# Patient Record
Sex: FEMALE | Race: OTHER | NOT HISPANIC OR LATINO
[De-identification: names, ages, dates, MRNs, and addresses within clinical notes are randomized per-mention and may not be internally consistent; named-entity substitution may affect disease eponyms.]

---

## 2017-04-18 PROBLEM — Z00.00 ENCOUNTER FOR PREVENTIVE HEALTH EXAMINATION: Status: ACTIVE | Noted: 2017-04-18

## 2017-08-15 ENCOUNTER — APPOINTMENT (OUTPATIENT)
Dept: ENDOCRINOLOGY | Facility: CLINIC | Age: 77
End: 2017-08-15
Payer: MEDICARE

## 2017-08-15 VITALS
DIASTOLIC BLOOD PRESSURE: 72 MMHG | WEIGHT: 249 LBS | HEART RATE: 72 BPM | BODY MASS INDEX: 48.88 KG/M2 | SYSTOLIC BLOOD PRESSURE: 141 MMHG | HEIGHT: 60 IN

## 2017-08-15 DIAGNOSIS — J44.9 CHRONIC OBSTRUCTIVE PULMONARY DISEASE, UNSPECIFIED: ICD-10-CM

## 2017-08-15 PROCEDURE — 99214 OFFICE O/P EST MOD 30 MIN: CPT

## 2017-08-15 RX ORDER — ALBUTEROL SULFATE 90 UG/1
108 (90 BASE) AEROSOL, METERED RESPIRATORY (INHALATION) EVERY 6 HOURS
Qty: 2 | Refills: 1 | Status: ACTIVE | COMMUNITY
Start: 2017-08-15 | End: 1900-01-01

## 2017-08-15 RX ORDER — PANTOPRAZOLE SODIUM 40 MG/10ML
40 INJECTION, POWDER, FOR SOLUTION INTRAVENOUS
Qty: 30 | Refills: 2 | Status: ACTIVE | COMMUNITY
Start: 2017-08-15 | End: 1900-01-01

## 2017-08-17 LAB
ALBUMIN SERPL ELPH-MCNC: 3.9 G/DL
ALP BLD-CCNC: 90 U/L
ALT SERPL-CCNC: 17 U/L
ANION GAP SERPL CALC-SCNC: 11 MMOL/L
AST SERPL-CCNC: 18 U/L
BILIRUB SERPL-MCNC: 0.4 MG/DL
BUN SERPL-MCNC: 12 MG/DL
CALCIUM SERPL-MCNC: 9.5 MG/DL
CHLORIDE SERPL-SCNC: 101 MMOL/L
CHOLEST SERPL-MCNC: 188 MG/DL
CHOLEST/HDLC SERPL: 2 RATIO
CO2 SERPL-SCNC: 33 MMOL/L
CREAT SERPL-MCNC: 0.85 MG/DL
CREAT SPEC-SCNC: 39 MG/DL
GLUCOSE SERPL-MCNC: 126 MG/DL
HBA1C MFR BLD HPLC: 8.5 %
HDLC SERPL-MCNC: 94 MG/DL
LDLC SERPL CALC-MCNC: 75 MG/DL
MICROALBUMIN 24H UR DL<=1MG/L-MCNC: 0.4 MG/DL
MICROALBUMIN/CREAT 24H UR-RTO: 10 MG/G
POTASSIUM SERPL-SCNC: 4.5 MMOL/L
PROT SERPL-MCNC: 7.3 G/DL
SODIUM SERPL-SCNC: 145 MMOL/L
T4 FREE SERPL-MCNC: 1.6 NG/DL
TRIGL SERPL-MCNC: 97 MG/DL
TSH SERPL-ACNC: 5.66 UIU/ML

## 2017-09-14 ENCOUNTER — APPOINTMENT (OUTPATIENT)
Dept: ENDOCRINOLOGY | Facility: CLINIC | Age: 77
End: 2017-09-14

## 2018-02-12 ENCOUNTER — APPOINTMENT (OUTPATIENT)
Dept: ENDOCRINOLOGY | Facility: CLINIC | Age: 78
End: 2018-02-12
Payer: MEDICARE

## 2018-02-12 VITALS
DIASTOLIC BLOOD PRESSURE: 73 MMHG | HEIGHT: 60 IN | HEART RATE: 78 BPM | SYSTOLIC BLOOD PRESSURE: 133 MMHG | WEIGHT: 245 LBS | BODY MASS INDEX: 48.1 KG/M2

## 2018-02-12 PROCEDURE — 99214 OFFICE O/P EST MOD 30 MIN: CPT | Mod: 25

## 2018-02-12 PROCEDURE — 36415 COLL VENOUS BLD VENIPUNCTURE: CPT

## 2018-02-12 RX ORDER — 70%ISOPROPYL ALCOHOL 0.7 ML/ML
70 SWAB TOPICAL
Qty: 1 | Refills: 2 | Status: ACTIVE | COMMUNITY
Start: 2018-02-12 | End: 1900-01-01

## 2018-03-02 LAB
ALBUMIN SERPL ELPH-MCNC: 3.9 G/DL
ALP BLD-CCNC: 76 U/L
ALT SERPL-CCNC: 17 U/L
ANION GAP SERPL CALC-SCNC: 14 MMOL/L
AST SERPL-CCNC: 24 U/L
BILIRUB SERPL-MCNC: 0.4 MG/DL
BUN SERPL-MCNC: 15 MG/DL
CALCIUM SERPL-MCNC: 10.1 MG/DL
CHLORIDE SERPL-SCNC: 98 MMOL/L
CHOLEST SERPL-MCNC: 193 MG/DL
CHOLEST/HDLC SERPL: 2.2 RATIO
CO2 SERPL-SCNC: 29 MMOL/L
CREAT SERPL-MCNC: 0.91 MG/DL
CREAT SPEC-SCNC: 56 MG/DL
GLUCOSE SERPL-MCNC: 121 MG/DL
HBA1C MFR BLD HPLC: 8.3 %
HDLC SERPL-MCNC: 89 MG/DL
LDLC SERPL CALC-MCNC: 89 MG/DL
MICROALBUMIN 24H UR DL<=1MG/L-MCNC: <0.3 MG/DL
MICROALBUMIN/CREAT 24H UR-RTO: NORMAL
POTASSIUM SERPL-SCNC: 4.6 MMOL/L
PROT SERPL-MCNC: 7.5 G/DL
SODIUM SERPL-SCNC: 141 MMOL/L
T4 FREE SERPL-MCNC: 1.7 NG/DL
TRIGL SERPL-MCNC: 76 MG/DL
TSH SERPL-ACNC: 3.17 UIU/ML

## 2018-04-19 ENCOUNTER — RX RENEWAL (OUTPATIENT)
Age: 78
End: 2018-04-19

## 2018-07-09 ENCOUNTER — APPOINTMENT (OUTPATIENT)
Dept: ENDOCRINOLOGY | Facility: CLINIC | Age: 78
End: 2018-07-09
Payer: MEDICARE

## 2018-07-09 VITALS
SYSTOLIC BLOOD PRESSURE: 128 MMHG | DIASTOLIC BLOOD PRESSURE: 69 MMHG | BODY MASS INDEX: 48.88 KG/M2 | WEIGHT: 249 LBS | HEART RATE: 68 BPM | HEIGHT: 60 IN

## 2018-07-09 PROCEDURE — 99214 OFFICE O/P EST MOD 30 MIN: CPT

## 2018-07-09 RX ORDER — 70%ISOPROPYL ALCOHOL 0.7 ML/ML
70 SWAB TOPICAL
Qty: 1 | Refills: 11 | Status: ACTIVE | COMMUNITY
Start: 2018-07-09 | End: 1900-01-01

## 2018-07-20 LAB
ALBUMIN SERPL ELPH-MCNC: 4 G/DL
ALP BLD-CCNC: 85 U/L
ALT SERPL-CCNC: 17 U/L
ANION GAP SERPL CALC-SCNC: 14 MMOL/L
AST SERPL-CCNC: 22 U/L
BILIRUB SERPL-MCNC: 0.4 MG/DL
BUN SERPL-MCNC: 15 MG/DL
CALCIUM SERPL-MCNC: 9.3 MG/DL
CHLORIDE SERPL-SCNC: 100 MMOL/L
CHOLEST SERPL-MCNC: 178 MG/DL
CHOLEST/HDLC SERPL: 1.8 RATIO
CO2 SERPL-SCNC: 29 MMOL/L
CREAT SERPL-MCNC: 0.87 MG/DL
CREAT SPEC-SCNC: 47 MG/DL
GLUCOSE SERPL-MCNC: 103 MG/DL
HBA1C MFR BLD HPLC: 8.6 %
HDLC SERPL-MCNC: 99 MG/DL
LDLC SERPL CALC-MCNC: 59 MG/DL
MICROALBUMIN 24H UR DL<=1MG/L-MCNC: <0.3 MG/DL
MICROALBUMIN/CREAT 24H UR-RTO: NORMAL
POTASSIUM SERPL-SCNC: 4.5 MMOL/L
PROT SERPL-MCNC: 7 G/DL
SODIUM SERPL-SCNC: 143 MMOL/L
TRIGL SERPL-MCNC: 102 MG/DL
TSH SERPL-ACNC: 4.48 UIU/ML

## 2018-10-26 ENCOUNTER — CHART COPY (OUTPATIENT)
Age: 78
End: 2018-10-26

## 2019-01-07 ENCOUNTER — APPOINTMENT (OUTPATIENT)
Dept: ENDOCRINOLOGY | Facility: CLINIC | Age: 79
End: 2019-01-07
Payer: MEDICARE

## 2019-01-07 VITALS
WEIGHT: 240 LBS | HEART RATE: 67 BPM | DIASTOLIC BLOOD PRESSURE: 84 MMHG | HEIGHT: 60 IN | BODY MASS INDEX: 47.12 KG/M2 | SYSTOLIC BLOOD PRESSURE: 136 MMHG

## 2019-01-07 LAB — GLUCOSE BLDC GLUCOMTR-MCNC: 120

## 2019-01-07 PROCEDURE — 99214 OFFICE O/P EST MOD 30 MIN: CPT | Mod: 25

## 2019-01-07 PROCEDURE — 82962 GLUCOSE BLOOD TEST: CPT

## 2019-01-07 RX ORDER — BLOOD SUGAR DIAGNOSTIC
STRIP MISCELLANEOUS
Qty: 100 | Refills: 0 | Status: ACTIVE | COMMUNITY
Start: 2018-07-09 | End: 1900-01-01

## 2019-01-07 NOTE — ASSESSMENT
[FreeTextEntry1] : #2- Long-standing history of type 2 diabetes uncontrol\par  Encourage patient to do structured exercises and work on her meal schedule\par Continue with NovoLog Mix 70/30 20 units for breakfast and 30 units with dinner\par Patient needs to optimize glucose control prior to gallbladder surgery\par new prescription given\par Labs today\par Return in 4 months\par \par #2- history of hypothyroidism, she's clinically euthyroid.\par No any other signs or symptoms or an endocrine autoimmune disorder\par Order TFTs and continue levothyroxine [Hypoglycemia Management] : hypoglycemia management [Long Term Vascular Complications] : long term vascular complications of diabetes [Importance of Diet and Exercise] : importance of diet and exercise to improve glycemic control, achieve weight loss and improve cardiovascular health

## 2019-01-07 NOTE — PHYSICAL EXAM
[Alert] : alert [No Acute Distress] : no acute distress [Well Nourished] : well nourished [Well Developed] : well developed [Normal Sclera/Conjunctiva] : normal sclera/conjunctiva [EOMI] : extra ocular movement intact [No Proptosis] : no proptosis [Normal Oropharynx] : the oropharynx was normal [Thyroid Not Enlarged] : the thyroid was not enlarged [No Thyroid Nodules] : there were no palpable thyroid nodules [No Accessory Muscle Use] : no accessory muscle use [Clear to Auscultation] : lungs were clear to auscultation bilaterally [Normal Rate] : heart rate was normal  [Pedal Pulses Normal] : the pedal pulses are present [No Edema] : there was no peripheral edema [Normal Bowel Sounds] : normal bowel sounds [No Spinal Tenderness] : no spinal tenderness [Spine Straight] : spine straight [No Stigmata of Cushings Syndrome] : no stigmata of cushings syndrome [Normal Gait] : normal gait [Normal Strength/Tone] : muscle strength and tone were normal [No Rash] : no rash [Normal Reflexes] : deep tendon reflexes were 2+ and symmetric [No Tremors] : no tremors [Oriented x3] : oriented to person, place, and time [Acanthosis Nigricans] : no acanthosis nigricans [de-identified] : ou retinopathy [de-identified] : varicose veins bilaterally, DP 2+ bilaterally

## 2019-01-07 NOTE — HISTORY OF PRESENT ILLNESS
[FreeTextEntry1] : She's a 79 y/o, t2dm d xed > 15 years ago. Uses a walker\par Multiple complications including CAD\par PMH:Obesity, depression, hypercholesterolemia, hypothyroidism\par Newly dx with gallbladder stones\par Patient is a hard time to follow a proper diet, tends to eat large portions\par \par Fasting glucose in the 170s.\par Hypoglycemia once or twice a month, readings in the 60's in am.\par Denies hypoglycemia is, chest pain\par Medicines: NovoLog Mix 70/30 20 units in the morning and 30 units with dinner\par Synthroid 137 mcg, carvedilol 3.12 mg twice a day, metformin 1000 mg twice a day, atorvastatin 10 mg daily,furosemide, and albuterol\par July 9, 2018, A1c 8.6%, ldl-cholesterol 59\par \par \par

## 2019-05-06 ENCOUNTER — APPOINTMENT (OUTPATIENT)
Dept: ENDOCRINOLOGY | Facility: CLINIC | Age: 79
End: 2019-05-06
Payer: MEDICARE

## 2019-05-06 VITALS
WEIGHT: 243 LBS | BODY MASS INDEX: 47.46 KG/M2 | DIASTOLIC BLOOD PRESSURE: 82 MMHG | SYSTOLIC BLOOD PRESSURE: 133 MMHG | HEART RATE: 86 BPM

## 2019-05-06 DIAGNOSIS — E11.40 TYPE 2 DIABETES MELLITUS WITH DIABETIC NEUROPATHY, UNSPECIFIED: ICD-10-CM

## 2019-05-06 PROCEDURE — 82962 GLUCOSE BLOOD TEST: CPT

## 2019-05-06 PROCEDURE — 99214 OFFICE O/P EST MOD 30 MIN: CPT | Mod: 25

## 2019-05-06 PROCEDURE — 36415 COLL VENOUS BLD VENIPUNCTURE: CPT

## 2019-05-08 NOTE — END OF VISIT
[FreeTextEntry3] : All medical record entries made by the Scribe were at my, Dr. Jai Key, direction and personally dictated by me on 05/06/2019. I have reviewed the chart and agree that the record accurately reflects my personal performance of the history, physical exam, assessment and plan. I have also personally directed, reviewed and agreed with the chart.\par  [>50% of Time Spent on Counseling for ____] : Greater than 50% of the encounter time was spent on counseling for [unfilled] [Time Spent: ___ minutes] : I have spent [unfilled] minutes of face to face time with the patient

## 2019-05-08 NOTE — HISTORY OF PRESENT ILLNESS
[FreeTextEntry1] : 2/12/18 A1c 8.3, TSH 3.17, Free T4 1.7, s. creat 0.91, LDL-c 89\par 7/9/18  A1c 8.6, LDL-c 59, s. creat 0.87, TSH 4.48, \par \par 79 y/o F pt, /84, BMI 46.87, with Hx of  T2DM (dx > 15 years ago) and with Hx of hypothyroidism. \par PMHx: COPD, CAD, obesity, depression, hypercholesterolemia, recently dx with gallbladder stones\par Last funduscopic visit in 2017\par Pt uses walker.\par \par Today pt presents with c/o back pain and lower extremities pain. She notes occasional feelings of cramps, numbness, and pins and needle sensation in lower extremities. Pt reports fluctuating sugar levels of ~170-180, which she relates to being emotional.\par \par Medicines: NovoLog Mix 70/30: 25u for breakfast and 30u for dinner, Synthroid 137mcg, Carvedilol 3.125mg, Metformin 1000mg BID, Atorvastatin 10mg QD, albuterol, Lasix 20mg, Lisinopril 5mg

## 2019-05-08 NOTE — ADDENDUM
[FreeTextEntry1] : I, Nicoluis eduardo Love, acted solely as a scribe for Dr. Jai Key on this date. 05/06/2019.\par

## 2019-05-08 NOTE — REVIEW OF SYSTEMS
[As Noted in HPI] : as noted in HPI [Back Pain] : back pain [Negative] : Endocrine [de-identified] : cramping, numbness, pins and needle sensation in lower extremities

## 2019-05-08 NOTE — PHYSICAL EXAM
[Alert] : alert [Normal Sclera/Conjunctiva] : normal sclera/conjunctiva [Thyroid Not Enlarged] : the thyroid was not enlarged [No Thyroid Nodules] : there were no palpable thyroid nodules [No Accessory Muscle Use] : no accessory muscle use [Clear to Auscultation] : lungs were clear to auscultation bilaterally [Pedal Pulses Normal] : the pedal pulses are present [Normal Rate] : heart rate was normal  [No Edema] : there was no peripheral edema [Normal Bowel Sounds] : normal bowel sounds [No Stigmata of Cushings Syndrome] : no stigmata of cushings syndrome [No Rash] : no rash [Oriented x3] : oriented to person, place, and time [Normal Reflexes] : deep tendon reflexes were 2+ and symmetric [No Tremors] : no tremors [Normal Outer Ear/Nose] : the ears and nose were normal in appearance [No Respiratory Distress] : no respiratory distress [de-identified] : ou retinopathy [Acanthosis Nigricans] : no acanthosis nigricans [de-identified] : varicose veins bilaterally, DP 2+ bilaterally

## 2019-05-08 NOTE — ASSESSMENT
[Hypoglycemia Management] : hypoglycemia management [Long Term Vascular Complications] : long term vascular complications of diabetes [Importance of Diet and Exercise] : importance of diet and exercise to improve glycemic control, achieve weight loss and improve cardiovascular health [FreeTextEntry1] : 77 y/o F pt with:\par 1. Hx of uncontrolled long standing T2DM fairly controlled\par Pt is extremely afraid of having hypoglycemia. Pt is currently taking NovoLog Mix 70/30: 25u for breakfast and 30u with dinner. Set A1c goals of 7.5-8%. Pt is c/o classical symptoms of peripheral neuropathy. Start Cymbalta 30mg tablet BID. Will sent labs today.\par \par 2. Morbid obesity BMI of 47.46:\par General principles on obesity management described and explained to pt. Pt understands that she must limit carbohydrates and calories intake. Set goal of body weight reduction by 10%, and plan for cholecystectomy.\par \par 2. Hx of hypothyroidism:\par Pt is on 137mcg Synthroid and appears euthyroid. Will order blood test today\par \par Return in 3 months. [Levothyroxine] : The patient was instructed to take Levothyroxine on an empty stomach, separate from vitamins, and wait at least 30 minutes before eating

## 2019-05-20 LAB
ALBUMIN SERPL ELPH-MCNC: 4 G/DL
ALP BLD-CCNC: 86 U/L
ALT SERPL-CCNC: 16 U/L
ANION GAP SERPL CALC-SCNC: 11 MMOL/L
AST SERPL-CCNC: 16 U/L
BILIRUB SERPL-MCNC: 0.4 MG/DL
BUN SERPL-MCNC: 13 MG/DL
CALCIUM SERPL-MCNC: 9.8 MG/DL
CHLORIDE SERPL-SCNC: 100 MMOL/L
CHOLEST SERPL-MCNC: 202 MG/DL
CHOLEST/HDLC SERPL: 2.1 RATIO
CO2 SERPL-SCNC: 33 MMOL/L
CREAT SERPL-MCNC: 0.78 MG/DL
ESTIMATED AVERAGE GLUCOSE: 206 MG/DL
GLUCOSE BLDC GLUCOMTR-MCNC: 206
GLUCOSE SERPL-MCNC: 116 MG/DL
HBA1C MFR BLD HPLC: 8.8 %
HDLC SERPL-MCNC: 95 MG/DL
LDLC SERPL CALC-MCNC: 87 MG/DL
POTASSIUM SERPL-SCNC: 4.4 MMOL/L
PROT SERPL-MCNC: 6.9 G/DL
SODIUM SERPL-SCNC: 144 MMOL/L
T4 FREE SERPL-MCNC: 1.6 NG/DL
TRIGL SERPL-MCNC: 102 MG/DL
TSH SERPL-ACNC: 3.3 UIU/ML

## 2019-08-05 ENCOUNTER — APPOINTMENT (OUTPATIENT)
Dept: ENDOCRINOLOGY | Facility: CLINIC | Age: 79
End: 2019-08-05
Payer: MEDICARE

## 2019-08-05 VITALS
SYSTOLIC BLOOD PRESSURE: 108 MMHG | WEIGHT: 241 LBS | HEART RATE: 74 BPM | HEIGHT: 60 IN | DIASTOLIC BLOOD PRESSURE: 61 MMHG | BODY MASS INDEX: 47.32 KG/M2

## 2019-08-05 DIAGNOSIS — L24.9 IRRITANT CONTACT DERMATITIS, UNSPECIFIED CAUSE: ICD-10-CM

## 2019-08-05 PROCEDURE — 82962 GLUCOSE BLOOD TEST: CPT

## 2019-08-05 PROCEDURE — 99214 OFFICE O/P EST MOD 30 MIN: CPT | Mod: 25

## 2019-08-09 LAB — GLUCOSE BLDC GLUCOMTR-MCNC: 150

## 2019-08-09 NOTE — PHYSICAL EXAM
[Alert] : alert [Normal Outer Ear/Nose] : the ears and nose were normal in appearance [Normal Sclera/Conjunctiva] : normal sclera/conjunctiva [Thyroid Not Enlarged] : the thyroid was not enlarged [No Thyroid Nodules] : there were no palpable thyroid nodules [No Respiratory Distress] : no respiratory distress [Clear to Auscultation] : lungs were clear to auscultation bilaterally [No Accessory Muscle Use] : no accessory muscle use [Pedal Pulses Normal] : the pedal pulses are present [Normal Rate] : heart rate was normal  [Normal Bowel Sounds] : normal bowel sounds [No Edema] : there was no peripheral edema [No Stigmata of Cushings Syndrome] : no stigmata of cushings syndrome [No Rash] : no rash [Normal Reflexes] : deep tendon reflexes were 2+ and symmetric [No Tremors] : no tremors [Oriented x3] : oriented to person, place, and time [Acanthosis Nigricans] : no acanthosis nigricans [de-identified] : ou retinopathy [de-identified] : varicose veins bilaterally, DP 2+ bilaterally [de-identified] : b/l dermatitis on lateral sides of abd

## 2019-08-09 NOTE — ASSESSMENT
[Hypoglycemia Management] : hypoglycemia management [Long Term Vascular Complications] : long term vascular complications of diabetes [Importance of Diet and Exercise] : importance of diet and exercise to improve glycemic control, achieve weight loss and improve cardiovascular health [Levothyroxine] : The patient was instructed to take Levothyroxine on an empty stomach, separate from vitamins, and wait at least 30 minutes before eating [FreeTextEntry1] : 79 y/o F pt with:\par 1. Hx of uncontrolled long standing T2DM with multiple complications:\par Pt understands the severity of her condition and that she is at an increased risk of developing cardiac events ,however, pt makes very little effort in her DM management. Diabetes treatment goals discussed with pt and her daughter. Pt is developing skin lesions under her breasts, refer pt to see dermatologist and continue present DM management. \par 2. Hypothyroidism, clinically euthyroid\par \par Return in 6 months.

## 2019-08-09 NOTE — END OF VISIT
[>50% of Time Spent on Counseling for ____] : Greater than 50% of the encounter time was spent on counseling for [unfilled] [Time Spent: ___ minutes] : I have spent [unfilled] minutes of face to face time with the patient [FreeTextEntry3] : All medical record entries made by the Scribe were at my, Dr. Jai Key, direction and personally dictated by me on 08/05/2019 I have reviewed the chart and agree that the record accurately reflects my personal performance of the history, physical exam, assessment and plan. I have also personally directed, reviewed and agreed with the chart.

## 2019-08-09 NOTE — HISTORY OF PRESENT ILLNESS
[FreeTextEntry1] : 2/12/18 A1c 8.3%, TSH 3.17, Free T4 1.7, s. creat 0.91, LDL-c 89\par 7/9/18  A1c 8.6%, LDL-c 59, s. creat 0.87, TSH 4.48, \par 5/6/19: A1c 8.8%, TSH 3.30, Free T4 1.6, Cholesterol 202, LDL-c 87, Glucose 116, s. creat 0.78\par \par 77 y/o F pt, /61, BMI 47.07, with Hx of  T2DM (dx > 15 years ago) and with Hx of hypothyroidism. \par PMHx: COPD, CAD, obesity, depression, hypercholesterolemia, recently dx with gallbladder stones\par Last funduscopic visit in 2017\par Pt uses walker.\par \par 1/7/19\par Pt presents with c/o back pain and lower extremities pain. She notes occasional feelings of cramps, numbness, and pins and needle sensation in lower extremities. Pt reports fluctuating sugar levels of ~170-180, which she relates to being emotional.\par \par 8/5/19 \par Today pt presents with her daughter for DM f/u, feeling a little down and depressed. She notes nothing has changed, has a difficulty time to comply to her diet, and notes her lifestyle is sedentary. Her daughter notes pt has an appointment with physical therapist and RD. Her daughter states pt is eating fruits at night. \par \par Medicines: NovoLog Mix 70/30: 20u for breakfast and 25- 30u for dinner, Synthroid 137mcg, Carvedilol 3.125mg, Metformin 1000mg BID, Atorvastatin 10mg QD, albuterol, Lasix 20mg, Lisinopril 5mg

## 2020-02-03 ENCOUNTER — APPOINTMENT (OUTPATIENT)
Dept: ENDOCRINOLOGY | Facility: CLINIC | Age: 80
End: 2020-02-03
Payer: MEDICARE

## 2020-02-03 VITALS
HEART RATE: 93 BPM | SYSTOLIC BLOOD PRESSURE: 144 MMHG | WEIGHT: 242 LBS | BODY MASS INDEX: 47.26 KG/M2 | DIASTOLIC BLOOD PRESSURE: 80 MMHG

## 2020-02-03 DIAGNOSIS — E66.9 OBESITY, UNSPECIFIED: ICD-10-CM

## 2020-02-03 DIAGNOSIS — B37.2 CANDIDIASIS OF SKIN AND NAIL: ICD-10-CM

## 2020-02-03 LAB — GLUCOSE BLDC GLUCOMTR-MCNC: 220

## 2020-02-03 PROCEDURE — 99214 OFFICE O/P EST MOD 30 MIN: CPT | Mod: 25

## 2020-02-03 PROCEDURE — 82962 GLUCOSE BLOOD TEST: CPT

## 2020-02-03 RX ORDER — DULOXETINE HYDROCHLORIDE 30 MG/1
30 CAPSULE, DELAYED RELEASE PELLETS ORAL TWICE DAILY
Qty: 60 | Refills: 2 | Status: ACTIVE | COMMUNITY
Start: 2019-05-06 | End: 1900-01-01

## 2020-02-05 NOTE — END OF VISIT
[>50% of Time Spent on Counseling for ____] : Greater than 50% of the encounter time was spent on counseling for [unfilled] [Time Spent: ___ minutes] : I have spent [unfilled] minutes of face to face time with the patient [FreeTextEntry3] : All medical record entries made by the Scribe were at my, Dr. Jai Key, direction and personally dictated by me on 02/03/2020. I have reviewed the chart and agree that the record accurately reflects my personal performance of the history, physical exam, assessment and plan. I have also personally directed, reviewed and agreed with the chart.

## 2020-02-05 NOTE — PHYSICAL EXAM
[Normal Sclera/Conjunctiva] : normal sclera/conjunctiva [Alert] : alert [Normal Outer Ear/Nose] : the ears and nose were normal in appearance [Thyroid Not Enlarged] : the thyroid was not enlarged [No Thyroid Nodules] : there were no palpable thyroid nodules [No Respiratory Distress] : no respiratory distress [Clear to Auscultation] : lungs were clear to auscultation bilaterally [No Accessory Muscle Use] : no accessory muscle use [Normal Rate] : heart rate was normal  [Pedal Pulses Normal] : the pedal pulses are present [No Edema] : there was no peripheral edema [Normal Bowel Sounds] : normal bowel sounds [No Rash] : no rash [No Stigmata of Cushings Syndrome] : no stigmata of cushings syndrome [Normal Reflexes] : deep tendon reflexes were 2+ and symmetric [Oriented x3] : oriented to person, place, and time [No Tremors] : no tremors [Acanthosis Nigricans] : no acanthosis nigricans [de-identified] : ou retinopathy [de-identified] : b/l dermatitis on lateral sides of abd  [de-identified] : varicose veins bilaterally, DP 2+ bilaterally

## 2020-02-05 NOTE — ADDENDUM
[FreeTextEntry1] : I, Barbara Love, acted solely as a scribe for Dr. Jai Key on this date. 02/03/2020.

## 2020-02-05 NOTE — HISTORY OF PRESENT ILLNESS
[FreeTextEntry1] : 2/12/18 A1c 8.3%, TSH 3.17, Free T4 1.7, s. creat 0.91, LDL-c 89\par 7/9/18  A1c 8.6%, LDL-c 59, s. creat 0.87, TSH 4.48, \par 5/6/19: A1c 8.8%, TSH 3.30, Free T4 1.6, Cholesterol 202, LDL-c 87, Glucose 116, s. creat 0.78\par \par 80 y/o F pt, /80, BMI 47.26, with Hx of  T2DM (dx > 15 years ago) and with Hx of hypothyroidism. \par PMHx: COPD, CAD, obesity, depression, hypercholesterolemia, gallbladder stones\par Last funduscopic visit in 2017\par Pt uses walker.\par \par 1/7/19\par Pt presents with c/o back pain and lower extremities pain. She notes occasional feelings of cramps, numbness, and pins and needle sensation in lower extremities. Pt reports fluctuating sugar levels of ~170-180, which she relates to being emotional.\par \par 8/5/19 \par Today pt presents with her daughter for DM f/u, feeling a little down and depressed. She notes nothing has changed, has a difficulty time to comply to her diet, and notes her lifestyle is sedentary. Her daughter notes pt has an appointment with physical therapist and RD. Her daughter states pt is eating fruits at night. \par \par 2/3/20\par Today pt presents with her  for DM f/u with POCT 220, feeling well with no major changes. Pt continues to c/o moisture under her breast. Pt reports she did not modify her lifestyle or diet, nor does she check BS regularly. \par Denies episodes of hypoglycemia. \par \par Medicines: NovoLog Mix 70/30: 25u for breakfast and 30u for dinner, Synthroid 137mcg, Carvedilol 3.125mg, Metformin 1000mg BID, Atorvastatin 10mg QD, albuterol, Lasix 20mg, Lisinopril 5mg

## 2020-02-05 NOTE — ASSESSMENT
[Hypoglycemia Management] : hypoglycemia management [Long Term Vascular Complications] : long term vascular complications of diabetes [Importance of Diet and Exercise] : importance of diet and exercise to improve glycemic control, achieve weight loss and improve cardiovascular health [Levothyroxine] : The patient was instructed to take Levothyroxine on an empty stomach, separate from vitamins, and wait at least 30 minutes before eating [FreeTextEntry1] : 79 y/o F pt with:\par 1. Hx of uncontrolled long standing T2DM with multiple complications:\par Explained to pt the importance of modifying her lifestyle/diet. Recommend pt increase NovoLog to 30u in the morning and 35 u at night. Refer pt to see NP in 1 month. Pt is developing moisture under her breasts, recommend pt keep the area dry and refer pt to see dermatologist.\par \par 2. Hx of Hypothyroidism:\par Pt is euthyroid, recommend pt continue LT4 137 mcg. Will order labs today. \par \par Return in 4 months.

## 2020-03-23 LAB
ALBUMIN SERPL ELPH-MCNC: 4.1 G/DL
ALP BLD-CCNC: 88 U/L
ALT SERPL-CCNC: 14 U/L
ANION GAP SERPL CALC-SCNC: 14 MMOL/L
AST SERPL-CCNC: 19 U/L
BILIRUB SERPL-MCNC: 0.4 MG/DL
BUN SERPL-MCNC: 15 MG/DL
CALCIUM SERPL-MCNC: 9.7 MG/DL
CHLORIDE SERPL-SCNC: 100 MMOL/L
CHOLEST SERPL-MCNC: 200 MG/DL
CHOLEST/HDLC SERPL: 2.2 RATIO
CO2 SERPL-SCNC: 29 MMOL/L
CREAT SERPL-MCNC: 0.81 MG/DL
ESTIMATED AVERAGE GLUCOSE: 220 MG/DL
GLUCOSE SERPL-MCNC: 147 MG/DL
HBA1C MFR BLD HPLC: 9.3 %
HDLC SERPL-MCNC: 90 MG/DL
LDLC SERPL CALC-MCNC: 93 MG/DL
POTASSIUM SERPL-SCNC: 4.5 MMOL/L
PROT SERPL-MCNC: 7.2 G/DL
SODIUM SERPL-SCNC: 143 MMOL/L
T4 FREE SERPL-MCNC: 1.7 NG/DL
TRIGL SERPL-MCNC: 89 MG/DL
TSH SERPL-ACNC: 1.5 UIU/ML

## 2020-06-16 ENCOUNTER — APPOINTMENT (OUTPATIENT)
Dept: ENDOCRINOLOGY | Facility: CLINIC | Age: 80
End: 2020-06-16

## 2021-03-03 ENCOUNTER — APPOINTMENT (OUTPATIENT)
Dept: ENDOCRINOLOGY | Facility: CLINIC | Age: 81
End: 2021-03-03
Payer: MEDICARE

## 2021-03-03 VITALS
HEART RATE: 75 BPM | DIASTOLIC BLOOD PRESSURE: 71 MMHG | WEIGHT: 249 LBS | SYSTOLIC BLOOD PRESSURE: 129 MMHG | HEIGHT: 60 IN | BODY MASS INDEX: 48.88 KG/M2

## 2021-03-03 PROCEDURE — 82962 GLUCOSE BLOOD TEST: CPT

## 2021-03-03 PROCEDURE — 99215 OFFICE O/P EST HI 40 MIN: CPT | Mod: 25

## 2021-03-03 PROCEDURE — 99072 ADDL SUPL MATRL&STAF TM PHE: CPT

## 2021-03-03 NOTE — REVIEW OF SYSTEMS
[Negative] : Heme/Lymph [Fatigue] : fatigue [Recent Weight Gain (___ Lbs)] : recent weight gain: [unfilled] lbs [Polyuria] : polyuria [Nocturia] : nocturia [Back Pain] : back pain [Knee Pain] : knee pain [Ankle Swelling] : ankle swelling [Dizziness] : dizziness [Polydipsia] : polydipsia

## 2021-03-05 NOTE — HISTORY OF PRESENT ILLNESS
[FreeTextEntry1] : 81 y/o F pt, with Hx of T2DM (dx >15 years ago) with multiple DM related complications including CAD, and Hx of hypothyroidism. \par Other PMHx: COPD, obesity, depression, hypercholesterolemia, gallbladder stones\par Last funduscopic visit: 2017\par No podiatrist visit in past two years. \par Pt uses walker.\par \par 03/03/2021 \par Pt was last seen in 2/2020. Pt presents today with POCT 130, /71 and BMI 48.63 for endocrine f/u accompanied by her . She has gained 9 lbs in last 2 years. Pt continues to have persistent back pain that makes her unable to move. Her knees are hurting more than usual and her ankle are swollen. Pt also reports of frequent dizziness, polyuria, nocturia and polydipsia. \par Pt's  has placed her on a strict calorie restricted diet for past few day and her BS has improved in past few days; it used to be in 400s. \par She saw her PCP last Thursday for regular visit who informed her that she was not doing well and she is at increased risk for complications. Pt is planning to travel tomorrow and her PCP is concerned about her possibly getting COVID infection and its outcomes. \par \par Current Medications: NovoLog 70/30 20 u to 25 qam & 30 35 u qpm,  Metformin 1000 mg bid, Atorvastatin 10mg qd, Levothyroxine 137 mcg, Carvedilol 3.125 mg bid, Lisinopril 5 mg, Lasix 20 mg\par \par Labs:\par - 2/3/20: A1c 9.3%, s.creat 0.81, Cholesterol 200, LDL-c 93, TSH 1.5, Free T4 1.7\par - 5/6/19: A1c 8.8%, s.creat 0.78, Cholesterol 202, LDL-c 87, TSH 3.30, Free T4 1.6\par - 2/12/18 A1c 8.3%, s.creat 0.91, LDL-c 89, TSH 3.17, Free T4 1.7 [Other:______] : [unfilled] [FreeTextEntry5] : 300

## 2021-03-05 NOTE — ASSESSMENT
[FreeTextEntry1] : 79 y/o F pt with:\par \par 1. T2DM (dx >15 yrs ago) with multiple complications including CAD. \par Pt was last seen in 2/2020. Her BS has been poorly managed and controlled for years. Over the past few days, glucose readings in 200 after following with her PCP. \par She is complaining of osmotic diuresis symptoms. Pt is feeling extremely tired, exhausted and weak. \par Her lifestyle is sedentary. In essence, she is stationary and does not do much activity resulting in weight gain and high glucose levels.\par I agree with her PCP's statement that she is not doing well and do not recommend pt travelling at present. \par She needs to optimize her DM, her medical care. Recommend pt to have evaluation with her cardiologist and call [facility] to address chronic back pain. No obvious clinical neurological deficit is detected. No stool and urinary incontinence symptoms. \par Continue with Metformin 1000 mg bid and NovoLog  25 u q am and 35 u q pm. \par She is scheduled to see PCP and cardiologist next week. \par Sent labs today; will call pt with results. \par \par 2. Hx of hypothyroidism:\par She has  symptoms that might correspond to hypothyroidism. \par Will check thyroid levels; will contact pt with results. \par \par Return in 4 weeks [Carbohydrate Consistent Diet] : carbohydrate consistent diet [Importance of Diet and Exercise] : importance of diet and exercise to improve glycemic control, achieve weight loss and improve cardiovascular health [Self Monitoring of Blood Glucose] : self monitoring of blood glucose

## 2021-03-05 NOTE — ADDENDUM
[FreeTextEntry1] : I, Mansi Alcaraz, acted solely as a scribe for Dr. Jai Key on this date. 03/03/2021.

## 2021-03-05 NOTE — PHYSICAL EXAM
[No Respiratory Distress] : no respiratory distress [Normal Rate] : heart rate was normal [Right Foot Was Examined] : right foot ~C was examined [Left Foot Was Examined] : left foot ~C was examined [1+] : 1+ in the dorsalis pedis [Normal Reflexes] : deep tendon reflexes were 2+ and symmetric [Oriented x3] : oriented to person, place, and time [Alert] : alert [Normal Sclera/Conjunctiva] : normal sclera/conjunctiva [Normal Outer Ear/Nose] : the ears and nose were normal in appearance [Thyroid Not Enlarged] : the thyroid was not enlarged [No Thyroid Nodules] : no palpable thyroid nodules [de-identified] : 1+ non pitting edema b/l  [de-identified] : uses walker. limited range of motion of knee

## 2021-03-05 NOTE — THERAPY
[Today's Date] : [unfilled] [Other:___] : [unfilled] [de-identified] : novolog mix 70/30 20-25 u q breakfast and 30-35 u at diner

## 2021-03-05 NOTE — END OF VISIT
[FreeTextEntry3] : All medical record entries made by the Scribe were at my, Dr. Jai Key, direction and personally dictated by me on 03/03/2021. I have reviewed the chart and agree that the record accurately reflects my personal performance of the history, physical exam, assessment and plan. I have also personally directed, reviewed and agreed with the chart.  [Time Spent: ___ minutes] : I have spent [unfilled] minutes of time on the encounter.

## 2021-03-12 RX ORDER — BLOOD SUGAR DIAGNOSTIC
STRIP MISCELLANEOUS 3 TIMES DAILY
Qty: 100 | Refills: 2 | Status: ACTIVE | COMMUNITY
Start: 2018-02-12 | End: 1900-01-01

## 2021-05-05 ENCOUNTER — APPOINTMENT (OUTPATIENT)
Dept: ENDOCRINOLOGY | Facility: CLINIC | Age: 81
End: 2021-05-05

## 2021-05-12 LAB
ALBUMIN SERPL ELPH-MCNC: 3.9 G/DL
ALP BLD-CCNC: 93 U/L
ALT SERPL-CCNC: 17 U/L
ANION GAP SERPL CALC-SCNC: 10 MMOL/L
AST SERPL-CCNC: 24 U/L
BILIRUB SERPL-MCNC: 0.4 MG/DL
BUN SERPL-MCNC: 12 MG/DL
CALCIUM SERPL-MCNC: 9.6 MG/DL
CHLORIDE SERPL-SCNC: 99 MMOL/L
CHOLEST SERPL-MCNC: 187 MG/DL
CO2 SERPL-SCNC: 32 MMOL/L
CREAT SERPL-MCNC: 0.78 MG/DL
CREAT SPEC-SCNC: 27 MG/DL
ESTIMATED AVERAGE GLUCOSE: 240 MG/DL
FOLATE SERPL-MCNC: 6.5 NG/ML
GLUCOSE BLDC GLUCOMTR-MCNC: 130
GLUCOSE SERPL-MCNC: 101 MG/DL
HBA1C MFR BLD HPLC: 10 %
HDLC SERPL-MCNC: 85 MG/DL
LDLC SERPL CALC-MCNC: 87 MG/DL
MICROALBUMIN 24H UR DL<=1MG/L-MCNC: <1.2 MG/DL
MICROALBUMIN/CREAT 24H UR-RTO: NORMAL MG/G
NONHDLC SERPL-MCNC: 102 MG/DL
POTASSIUM SERPL-SCNC: 5 MMOL/L
PROT SERPL-MCNC: 6.9 G/DL
SODIUM SERPL-SCNC: 141 MMOL/L
TRIGL SERPL-MCNC: 75 MG/DL
TSH SERPL-ACNC: 3.26 UIU/ML
VIT B12 SERPL-MCNC: 760 PG/ML

## 2021-08-12 ENCOUNTER — APPOINTMENT (OUTPATIENT)
Dept: ENDOCRINOLOGY | Facility: CLINIC | Age: 81
End: 2021-08-12
Payer: MEDICARE

## 2021-08-12 DIAGNOSIS — E03.9 HYPOTHYROIDISM, UNSPECIFIED: ICD-10-CM

## 2021-08-12 DIAGNOSIS — E11.65 TYPE 2 DIABETES MELLITUS WITH HYPERGLYCEMIA: ICD-10-CM

## 2021-08-12 PROCEDURE — 99214 OFFICE O/P EST MOD 30 MIN: CPT

## 2021-08-12 PROCEDURE — 99443: CPT

## 2021-08-12 RX ORDER — METFORMIN HYDROCHLORIDE 1000 MG/1
1000 TABLET, COATED ORAL
Qty: 60 | Refills: 6 | Status: DISCONTINUED | COMMUNITY
Start: 2018-07-09 | End: 2021-08-12

## 2021-08-12 RX ORDER — BLOOD SUGAR DIAGNOSTIC
STRIP MISCELLANEOUS
Qty: 4 | Refills: 3 | Status: ACTIVE | COMMUNITY
Start: 2021-03-03 | End: 1900-01-01

## 2021-08-12 RX ORDER — INSULIN ASPART 100 [IU]/ML
(70-30) 100 INJECTION, SUSPENSION SUBCUTANEOUS
Qty: 3 | Refills: 0 | Status: ACTIVE | COMMUNITY
Start: 2018-02-12 | End: 1900-01-01

## 2021-08-12 NOTE — REASON FOR VISIT
[Home] : at home, [unfilled] , at the time of the visit. [Medical Office: ([facility])___] : at the medical office located in  [Spouse] : spouse [Verbal consent obtained from patient] : the patient, [unfilled] [Follow - Up] : a follow-up visit [DM Type 2] : DM Type 2

## 2021-08-13 PROBLEM — E11.65 DIABETES MELLITUS OUT OF CONTROL: Status: ACTIVE | Noted: 2017-08-15

## 2021-08-13 PROBLEM — E03.9 ADULT HYPOTHYROIDISM: Status: ACTIVE | Noted: 2017-08-15

## 2021-08-13 NOTE — HISTORY OF PRESENT ILLNESS
[FreeTextEntry1] : 80 y/o F pt, with Hx of T2DM (dx >15 years ago) with multiple DM related complications including CAD- angina, and Hx of hypothyroidism. \par Other PMHx: COPD, Obesity, Depression, Hypercholesterolemia, Gallbladder stones\par Last funduscopic visit: 2017\par Pt uses walker.\par \par 08/12/2021\par Pt did not have any questions prior to the initiation of the telephonic visit. \par Pt presents today via telephone for endocrine f/u, feeling better with no acute complaints. She has been on diet for past 5 months and has lost 15 lbs. Pt notes FBS fluctuations between 110- 170 and pre-dinner BS under 200. Pt self-discontinued Metformin because she was experiencing stomach discomfort on it. \par Pt has not seen cardiologist for a few years. She has already received both doses of COVID vaccine. \par \par Current Medications: NovoLog 70/30 20 u qam & 35 u qpm, Atorvastatin 10 mg qd, Levothyroxine 137 mcg, Carvedilol 3.125 mg bid, Lisinopril 5 mg, Lasix 20 mg\par Metformin 1000 mg bid (self-DCed 2/2 GI discomfort)\par \par Labs:\par - 03/03/21: A1c 10.0%, s.creat 0.78, LDL-c 87, TSH 3.26, \par - 02/03/20: A1c 9.3%, s.creat 0.81, Cholesterol 200, LDL-c 93, TSH 1.5, Free T4 1.7\par - 05/06/19: A1c 8.8%, s.creat 0.78, Cholesterol 202, LDL-c 87, TSH 3.30, Free T4 1.6\par - 02/12/18 A1c 8.3%, s.creat 0.91, LDL-c 89, TSH 3.17, Free T4 1.7

## 2021-08-13 NOTE — END OF VISIT
[FreeTextEntry3] : All medical record entries made by the Scribe were at my, Dr. Jai Key, direction and personally dictated by me on 08/12/2021. I have reviewed the chart and agree that the record accurately reflects my personal performance of the history, physical exam, assessment and plan. I have also personally directed, reviewed and agreed with the chart.  [Time Spent: ___ minutes] : I have spent [unfilled] minutes of time on the encounter.

## 2021-08-13 NOTE — ASSESSMENT
[FreeTextEntry1] : 80 y/o F pt with:\par \par 1. T2DM (dx >15 yrs ago) with multiple complications including CAD:\par She and her  are working towards reaching better  DM outcome. She has decreased food portions. As ar result, she has lost 15 lbs. FBS has overall improved. ~30% of the readings are above 170. \par She saw ophthalmologist last year and have not seen cardiologist for a long time. She is scheduled to see them tomorrow. \par Given her Hx of angina and DM, informed pt that she needs to continue with Atorvastatin 10 mg qd. Continue with NovoLog 70/30 20 u qam and 35 u qpm. She is also on Carvedilol, Lisinopril and Lasix. \par \par 2. Hx of hypothyroidism:\par Pt appears to be euthyroid. \par Will continue with Levothyroxine 137 mcg. \par \par Return in 4 months for onsite visit [Carbohydrate Consistent Diet] : carbohydrate consistent diet [Importance of Diet and Exercise] : importance of diet and exercise to improve glycemic control, achieve weight loss and improve cardiovascular health [Self Monitoring of Blood Glucose] : self monitoring of blood glucose

## 2022-12-14 NOTE — ADDENDUM
[FreeTextEntry1] : I, Mansi Alcaraz, acted solely as a scribe for Dr. Jai Key on this date. 08/12/2021.  No

## 2025-05-09 ENCOUNTER — INPATIENT (INPATIENT)
Facility: HOSPITAL | Age: 85
LOS: 3 days | Discharge: HOME HEALTH | End: 2025-05-13
Attending: INTERNAL MEDICINE | Admitting: INTERNAL MEDICINE
Payer: MEDICARE

## 2025-05-09 ENCOUNTER — RESULT REVIEW (OUTPATIENT)
Age: 85
End: 2025-05-09

## 2025-05-09 VITALS
WEIGHT: 244.05 LBS | HEIGHT: 60 IN | DIASTOLIC BLOOD PRESSURE: 87 MMHG | SYSTOLIC BLOOD PRESSURE: 147 MMHG | RESPIRATION RATE: 18 BRPM | HEART RATE: 69 BPM | OXYGEN SATURATION: 94 %

## 2025-05-09 DIAGNOSIS — J45.901 UNSPECIFIED ASTHMA WITH (ACUTE) EXACERBATION: ICD-10-CM

## 2025-05-09 DIAGNOSIS — K21.9 GASTRO-ESOPHAGEAL REFLUX DISEASE WITHOUT ESOPHAGITIS: ICD-10-CM

## 2025-05-09 DIAGNOSIS — I10 ESSENTIAL (PRIMARY) HYPERTENSION: ICD-10-CM

## 2025-05-09 DIAGNOSIS — I50.22 CHRONIC SYSTOLIC (CONGESTIVE) HEART FAILURE: ICD-10-CM

## 2025-05-09 DIAGNOSIS — A41.9 SEPSIS, UNSPECIFIED ORGANISM: ICD-10-CM

## 2025-05-09 DIAGNOSIS — E11.9 TYPE 2 DIABETES MELLITUS WITHOUT COMPLICATIONS: ICD-10-CM

## 2025-05-09 DIAGNOSIS — Z90.710 ACQUIRED ABSENCE OF BOTH CERVIX AND UTERUS: Chronic | ICD-10-CM

## 2025-05-09 DIAGNOSIS — J44.1 CHRONIC OBSTRUCTIVE PULMONARY DISEASE WITH (ACUTE) EXACERBATION: ICD-10-CM

## 2025-05-09 DIAGNOSIS — Z29.9 ENCOUNTER FOR PROPHYLACTIC MEASURES, UNSPECIFIED: ICD-10-CM

## 2025-05-09 DIAGNOSIS — E03.9 HYPOTHYROIDISM, UNSPECIFIED: ICD-10-CM

## 2025-05-09 DIAGNOSIS — I25.10 ATHEROSCLEROTIC HEART DISEASE OF NATIVE CORONARY ARTERY WITHOUT ANGINA PECTORIS: ICD-10-CM

## 2025-05-09 LAB
A1C WITH ESTIMATED AVERAGE GLUCOSE RESULT: 9 % — HIGH (ref 4–5.6)
ADD ON TEST-SPECIMEN IN LAB: SIGNIFICANT CHANGE UP
ADD ON TEST-SPECIMEN IN LAB: SIGNIFICANT CHANGE UP
ALBUMIN SERPL ELPH-MCNC: 3 G/DL — LOW (ref 3.4–5)
ALP SERPL-CCNC: 154 U/L — HIGH (ref 40–120)
ALT FLD-CCNC: 24 U/L — SIGNIFICANT CHANGE UP (ref 12–42)
ANION GAP SERPL CALC-SCNC: 0 MMOL/L — LOW (ref 9–16)
ANION GAP SERPL CALC-SCNC: 6 MMOL/L — SIGNIFICANT CHANGE UP (ref 5–17)
AST SERPL-CCNC: 34 U/L — SIGNIFICANT CHANGE UP (ref 15–37)
BASOPHILS # BLD AUTO: 0.03 K/UL — SIGNIFICANT CHANGE UP (ref 0–0.2)
BASOPHILS NFR BLD AUTO: 0.6 % — SIGNIFICANT CHANGE UP (ref 0–2)
BILIRUB SERPL-MCNC: 0.5 MG/DL — SIGNIFICANT CHANGE UP (ref 0.2–1.2)
BUN SERPL-MCNC: 24 MG/DL — HIGH (ref 7–23)
BUN SERPL-MCNC: 24 MG/DL — HIGH (ref 7–23)
CALCIUM SERPL-MCNC: 9.3 MG/DL — SIGNIFICANT CHANGE UP (ref 8.4–10.5)
CALCIUM SERPL-MCNC: 9.7 MG/DL — SIGNIFICANT CHANGE UP (ref 8.5–10.5)
CHLORIDE SERPL-SCNC: 101 MMOL/L — SIGNIFICANT CHANGE UP (ref 96–108)
CHLORIDE SERPL-SCNC: 95 MMOL/L — LOW (ref 96–108)
CHOLEST SERPL-MCNC: 202 MG/DL — HIGH
CO2 SERPL-SCNC: 36 MMOL/L — HIGH (ref 22–31)
CO2 SERPL-SCNC: 36 MMOL/L — HIGH (ref 22–31)
CREAT SERPL-MCNC: 0.71 MG/DL — SIGNIFICANT CHANGE UP (ref 0.5–1.3)
CREAT SERPL-MCNC: 0.79 MG/DL — SIGNIFICANT CHANGE UP (ref 0.5–1.3)
EGFR: 74 ML/MIN/1.73M2 — SIGNIFICANT CHANGE UP
EGFR: 74 ML/MIN/1.73M2 — SIGNIFICANT CHANGE UP
EGFR: 84 ML/MIN/1.73M2 — SIGNIFICANT CHANGE UP
EGFR: 84 ML/MIN/1.73M2 — SIGNIFICANT CHANGE UP
EOSINOPHIL # BLD AUTO: 0.04 K/UL — SIGNIFICANT CHANGE UP (ref 0–0.5)
EOSINOPHIL NFR BLD AUTO: 0.9 % — SIGNIFICANT CHANGE UP (ref 0–6)
ESTIMATED AVERAGE GLUCOSE: 212 MG/DL — HIGH (ref 68–114)
FLUAV AG NPH QL: SIGNIFICANT CHANGE UP
FLUBV AG NPH QL: SIGNIFICANT CHANGE UP
GLUCOSE SERPL-MCNC: 183 MG/DL — HIGH (ref 70–99)
GLUCOSE SERPL-MCNC: 255 MG/DL — HIGH (ref 70–99)
HCT VFR BLD CALC: 31.7 % — LOW (ref 34.5–45)
HCT VFR BLD CALC: 34.4 % — LOW (ref 34.5–45)
HDLC SERPL-MCNC: 95 MG/DL — SIGNIFICANT CHANGE UP
HGB BLD-MCNC: 10.6 G/DL — LOW (ref 11.5–15.5)
HGB BLD-MCNC: 9.9 G/DL — LOW (ref 11.5–15.5)
IMM GRANULOCYTES # BLD AUTO: 0.02 K/UL — SIGNIFICANT CHANGE UP (ref 0–0.07)
IMM GRANULOCYTES NFR BLD AUTO: 0.4 % — SIGNIFICANT CHANGE UP (ref 0–0.9)
LACTATE BLDV-MCNC: 0.6 MMOL/L — SIGNIFICANT CHANGE UP (ref 0.5–2)
LDLC SERPL-MCNC: 99 MG/DL — SIGNIFICANT CHANGE UP
LEGIONELLA AG UR QL: NEGATIVE — SIGNIFICANT CHANGE UP
LIPID PNL WITH DIRECT LDL SERPL: 99 MG/DL — SIGNIFICANT CHANGE UP
LYMPHOCYTES # BLD AUTO: 1.03 K/UL — SIGNIFICANT CHANGE UP (ref 1–3.3)
LYMPHOCYTES NFR BLD AUTO: 22.2 % — SIGNIFICANT CHANGE UP (ref 13–44)
MAGNESIUM SERPL-MCNC: 1.6 MG/DL — SIGNIFICANT CHANGE UP (ref 1.6–2.6)
MCHC RBC-ENTMCNC: 27.2 PG — SIGNIFICANT CHANGE UP (ref 27–34)
MCHC RBC-ENTMCNC: 27.8 PG — SIGNIFICANT CHANGE UP (ref 27–34)
MCHC RBC-ENTMCNC: 30.8 G/DL — LOW (ref 32–36)
MCHC RBC-ENTMCNC: 31.2 G/DL — LOW (ref 32–36)
MCV RBC AUTO: 88.4 FL — SIGNIFICANT CHANGE UP (ref 80–100)
MCV RBC AUTO: 89 FL — SIGNIFICANT CHANGE UP (ref 80–100)
MONOCYTES # BLD AUTO: 0.31 K/UL — SIGNIFICANT CHANGE UP (ref 0–0.9)
MONOCYTES NFR BLD AUTO: 6.7 % — SIGNIFICANT CHANGE UP (ref 2–14)
NEUTROPHILS # BLD AUTO: 3.22 K/UL — SIGNIFICANT CHANGE UP (ref 1.8–7.4)
NEUTROPHILS NFR BLD AUTO: 69.2 % — SIGNIFICANT CHANGE UP (ref 43–77)
NONHDLC SERPL-MCNC: 107 MG/DL — SIGNIFICANT CHANGE UP
NRBC # BLD AUTO: 0 K/UL — SIGNIFICANT CHANGE UP (ref 0–0)
NRBC # FLD: 0 K/UL — SIGNIFICANT CHANGE UP (ref 0–0)
NRBC BLD AUTO-RTO: 0 /100 WBCS — SIGNIFICANT CHANGE UP (ref 0–0)
NRBC BLD AUTO-RTO: 0 /100 WBCS — SIGNIFICANT CHANGE UP (ref 0–0)
NT-PROBNP SERPL-SCNC: 125 PG/ML — SIGNIFICANT CHANGE UP
PCO2 BLDA: 61 MMHG — HIGH (ref 32–35)
PCO2 BLDV: 70 MMHG — CRITICAL HIGH (ref 39–42)
PCO2 BLDV: 72 MMHG — CRITICAL HIGH (ref 39–42)
PCO2 BLDV: 73 MMHG — CRITICAL HIGH (ref 39–42)
PH BLDA: 7.42 — SIGNIFICANT CHANGE UP (ref 7.35–7.45)
PH BLDV: 7.37 — SIGNIFICANT CHANGE UP (ref 7.32–7.43)
PH BLDV: 7.38 — SIGNIFICANT CHANGE UP (ref 7.32–7.43)
PH BLDV: 7.38 — SIGNIFICANT CHANGE UP (ref 7.32–7.43)
PHOSPHATE SERPL-MCNC: 3.8 MG/DL — SIGNIFICANT CHANGE UP (ref 2.5–4.5)
PLATELET # BLD AUTO: 251 K/UL — SIGNIFICANT CHANGE UP (ref 150–400)
PLATELET # BLD AUTO: 254 K/UL — SIGNIFICANT CHANGE UP (ref 150–400)
PMV BLD: 12.2 FL — SIGNIFICANT CHANGE UP (ref 7–13)
PO2 BLDA: 93 MMHG — SIGNIFICANT CHANGE UP (ref 83–108)
PO2 BLDV: 36 MMHG — SIGNIFICANT CHANGE UP (ref 25–45)
PO2 BLDV: 45 MMHG — SIGNIFICANT CHANGE UP (ref 25–45)
PO2 BLDV: 52 MMHG — HIGH (ref 25–45)
POTASSIUM SERPL-MCNC: 4.5 MMOL/L — SIGNIFICANT CHANGE UP (ref 3.5–5.3)
POTASSIUM SERPL-MCNC: 5 MMOL/L — SIGNIFICANT CHANGE UP (ref 3.5–5.3)
POTASSIUM SERPL-SCNC: 4.5 MMOL/L — SIGNIFICANT CHANGE UP (ref 3.5–5.3)
POTASSIUM SERPL-SCNC: 5 MMOL/L — SIGNIFICANT CHANGE UP (ref 3.5–5.3)
PROCALCITONIN SERPL-MCNC: 0.02 NG/ML — SIGNIFICANT CHANGE UP (ref 0.02–0.1)
PROT SERPL-MCNC: 7.2 G/DL — SIGNIFICANT CHANGE UP (ref 6.4–8.2)
RAPID RVP RESULT: SIGNIFICANT CHANGE UP
RBC # BLD: 3.56 M/UL — LOW (ref 3.8–5.2)
RBC # BLD: 3.89 M/UL — SIGNIFICANT CHANGE UP (ref 3.8–5.2)
RBC # FLD: 17.2 % — HIGH (ref 10.3–14.5)
RBC # FLD: 17.2 % — HIGH (ref 10.3–14.5)
RSV RNA NPH QL NAA+NON-PROBE: SIGNIFICANT CHANGE UP
S PNEUM AG UR QL: NEGATIVE — SIGNIFICANT CHANGE UP
SAO2 % BLDA: 95.6 % — SIGNIFICANT CHANGE UP (ref 94–98)
SAO2 % BLDV: 55 % — LOW (ref 67–88)
SAO2 % BLDV: 72.2 % — SIGNIFICANT CHANGE UP (ref 67–88)
SAO2 % BLDV: 80.4 % — SIGNIFICANT CHANGE UP (ref 67–88)
SARS-COV-2 RNA SPEC QL NAA+PROBE: SIGNIFICANT CHANGE UP
SARS-COV-2 RNA SPEC QL NAA+PROBE: SIGNIFICANT CHANGE UP
SODIUM SERPL-SCNC: 137 MMOL/L — SIGNIFICANT CHANGE UP (ref 132–145)
SODIUM SERPL-SCNC: 137 MMOL/L — SIGNIFICANT CHANGE UP (ref 135–145)
SOURCE RESPIRATORY: SIGNIFICANT CHANGE UP
TRIGL SERPL-MCNC: 40 MG/DL — SIGNIFICANT CHANGE UP
TROPONIN I, HIGH SENSITIVITY RESULT: 10.5 NG/L — SIGNIFICANT CHANGE UP
TSH SERPL-MCNC: 10.7 UIU/ML — HIGH (ref 0.36–3.74)
WBC # BLD: 4 K/UL — SIGNIFICANT CHANGE UP (ref 3.8–10.5)
WBC # BLD: 4.65 K/UL — SIGNIFICANT CHANGE UP (ref 3.8–10.5)
WBC # FLD AUTO: 4 K/UL — SIGNIFICANT CHANGE UP (ref 3.8–10.5)
WBC # FLD AUTO: 4.65 K/UL — SIGNIFICANT CHANGE UP (ref 3.8–10.5)

## 2025-05-09 RX ORDER — LEVOTHYROXINE SODIUM 300 MCG
150 TABLET ORAL DAILY
Refills: 0 | Status: DISCONTINUED | OUTPATIENT
Start: 2025-05-10 | End: 2025-05-13

## 2025-05-09 RX ORDER — INSULIN LISPRO 100 U/ML
INJECTION, SOLUTION INTRAVENOUS; SUBCUTANEOUS
Refills: 0 | Status: DISCONTINUED | OUTPATIENT
Start: 2025-05-09 | End: 2025-05-09

## 2025-05-09 RX ORDER — DEXTROSE 50 % IN WATER 50 %
25 SYRINGE (ML) INTRAVENOUS ONCE
Refills: 0 | Status: DISCONTINUED | OUTPATIENT
Start: 2025-05-09 | End: 2025-05-13

## 2025-05-09 RX ORDER — INSULIN GLARGINE-YFGN 100 [IU]/ML
16 INJECTION, SOLUTION SUBCUTANEOUS AT BEDTIME
Refills: 0 | Status: DISCONTINUED | OUTPATIENT
Start: 2025-05-09 | End: 2025-05-11

## 2025-05-09 RX ORDER — LISINOPRIL 5 MG/1
10 TABLET ORAL DAILY
Refills: 0 | Status: DISCONTINUED | OUTPATIENT
Start: 2025-05-09 | End: 2025-05-13

## 2025-05-09 RX ORDER — IPRATROPIUM BROMIDE AND ALBUTEROL SULFATE .5; 2.5 MG/3ML; MG/3ML
3 SOLUTION RESPIRATORY (INHALATION) ONCE
Refills: 0 | Status: COMPLETED | OUTPATIENT
Start: 2025-05-09 | End: 2025-05-09

## 2025-05-09 RX ORDER — FUROSEMIDE 10 MG/ML
40 INJECTION INTRAMUSCULAR; INTRAVENOUS EVERY 24 HOURS
Refills: 0 | Status: DISCONTINUED | OUTPATIENT
Start: 2025-05-09 | End: 2025-05-09

## 2025-05-09 RX ORDER — AZITHROMYCIN 250 MG
500 CAPSULE ORAL ONCE
Refills: 0 | Status: COMPLETED | OUTPATIENT
Start: 2025-05-09 | End: 2025-05-09

## 2025-05-09 RX ORDER — DEXTROSE 50 % IN WATER 50 %
12.5 SYRINGE (ML) INTRAVENOUS ONCE
Refills: 0 | Status: DISCONTINUED | OUTPATIENT
Start: 2025-05-09 | End: 2025-05-13

## 2025-05-09 RX ORDER — SODIUM CHLORIDE 9 G/1000ML
1000 INJECTION, SOLUTION INTRAVENOUS
Refills: 0 | Status: DISCONTINUED | OUTPATIENT
Start: 2025-05-09 | End: 2025-05-13

## 2025-05-09 RX ORDER — INSULIN LISPRO 100 U/ML
INJECTION, SOLUTION INTRAVENOUS; SUBCUTANEOUS
Refills: 0 | Status: DISCONTINUED | OUTPATIENT
Start: 2025-05-09 | End: 2025-05-11

## 2025-05-09 RX ORDER — CEFTRIAXONE 500 MG/1
1000 INJECTION, POWDER, FOR SOLUTION INTRAMUSCULAR; INTRAVENOUS ONCE
Refills: 0 | Status: COMPLETED | OUTPATIENT
Start: 2025-05-09 | End: 2025-05-09

## 2025-05-09 RX ORDER — ENOXAPARIN SODIUM 100 MG/ML
40 INJECTION SUBCUTANEOUS EVERY 12 HOURS
Refills: 0 | Status: DISCONTINUED | OUTPATIENT
Start: 2025-05-09 | End: 2025-05-13

## 2025-05-09 RX ORDER — METHYLPREDNISOLONE ACETATE 80 MG/ML
125 INJECTION, SUSPENSION INTRA-ARTICULAR; INTRALESIONAL; INTRAMUSCULAR; SOFT TISSUE EVERY 8 HOURS
Refills: 0 | Status: DISCONTINUED | OUTPATIENT
Start: 2025-05-09 | End: 2025-05-09

## 2025-05-09 RX ORDER — FUROSEMIDE 10 MG/ML
40 INJECTION INTRAMUSCULAR; INTRAVENOUS ONCE
Refills: 0 | Status: COMPLETED | OUTPATIENT
Start: 2025-05-09 | End: 2025-05-09

## 2025-05-09 RX ORDER — PREDNISONE 20 MG/1
40 TABLET ORAL DAILY
Refills: 0 | Status: COMPLETED | OUTPATIENT
Start: 2025-05-10 | End: 2025-05-13

## 2025-05-09 RX ORDER — ATORVASTATIN CALCIUM 80 MG/1
40 TABLET, FILM COATED ORAL AT BEDTIME
Refills: 0 | Status: DISCONTINUED | OUTPATIENT
Start: 2025-05-09 | End: 2025-05-13

## 2025-05-09 RX ORDER — ENOXAPARIN SODIUM 100 MG/ML
40 INJECTION SUBCUTANEOUS EVERY 24 HOURS
Refills: 0 | Status: DISCONTINUED | OUTPATIENT
Start: 2025-05-09 | End: 2025-05-09

## 2025-05-09 RX ORDER — ACETAMINOPHEN 500 MG/5ML
650 LIQUID (ML) ORAL EVERY 6 HOURS
Refills: 0 | Status: DISCONTINUED | OUTPATIENT
Start: 2025-05-09 | End: 2025-05-13

## 2025-05-09 RX ORDER — ONDANSETRON HCL/PF 4 MG/2 ML
4 VIAL (ML) INJECTION EVERY 8 HOURS
Refills: 0 | Status: DISCONTINUED | OUTPATIENT
Start: 2025-05-09 | End: 2025-05-13

## 2025-05-09 RX ORDER — IPRATROPIUM BROMIDE AND ALBUTEROL SULFATE .5; 2.5 MG/3ML; MG/3ML
3 SOLUTION RESPIRATORY (INHALATION) EVERY 4 HOURS
Refills: 0 | Status: DISCONTINUED | OUTPATIENT
Start: 2025-05-09 | End: 2025-05-13

## 2025-05-09 RX ORDER — DEXTROSE 50 % IN WATER 50 %
15 SYRINGE (ML) INTRAVENOUS ONCE
Refills: 0 | Status: DISCONTINUED | OUTPATIENT
Start: 2025-05-09 | End: 2025-05-13

## 2025-05-09 RX ORDER — INSULIN LISPRO 100 U/ML
8 INJECTION, SOLUTION INTRAVENOUS; SUBCUTANEOUS
Refills: 0 | Status: DISCONTINUED | OUTPATIENT
Start: 2025-05-09 | End: 2025-05-13

## 2025-05-09 RX ORDER — LEVOTHYROXINE SODIUM 300 MCG
150 TABLET ORAL DAILY
Refills: 0 | Status: DISCONTINUED | OUTPATIENT
Start: 2025-05-09 | End: 2025-05-09

## 2025-05-09 RX ORDER — GLUCAGON 3 MG/1
1 POWDER NASAL ONCE
Refills: 0 | Status: DISCONTINUED | OUTPATIENT
Start: 2025-05-09 | End: 2025-05-13

## 2025-05-09 RX ORDER — FUROSEMIDE 10 MG/ML
40 INJECTION INTRAMUSCULAR; INTRAVENOUS EVERY 24 HOURS
Refills: 0 | Status: DISCONTINUED | OUTPATIENT
Start: 2025-05-10 | End: 2025-05-13

## 2025-05-09 RX ADMIN — INSULIN GLARGINE-YFGN 16 UNIT(S): 100 INJECTION, SOLUTION SUBCUTANEOUS at 22:25

## 2025-05-09 RX ADMIN — ENOXAPARIN SODIUM 40 MILLIGRAM(S): 100 INJECTION SUBCUTANEOUS at 17:54

## 2025-05-09 RX ADMIN — METHYLPREDNISOLONE ACETATE 125 MILLIGRAM(S): 80 INJECTION, SUSPENSION INTRA-ARTICULAR; INTRALESIONAL; INTRAMUSCULAR; SOFT TISSUE at 03:13

## 2025-05-09 RX ADMIN — INSULIN LISPRO 2: 100 INJECTION, SOLUTION INTRAVENOUS; SUBCUTANEOUS at 22:24

## 2025-05-09 RX ADMIN — INSULIN LISPRO 8 UNIT(S): 100 INJECTION, SOLUTION INTRAVENOUS; SUBCUTANEOUS at 16:23

## 2025-05-09 RX ADMIN — IPRATROPIUM BROMIDE AND ALBUTEROL SULFATE 3 MILLILITER(S): .5; 2.5 SOLUTION RESPIRATORY (INHALATION) at 10:00

## 2025-05-09 RX ADMIN — ATORVASTATIN CALCIUM 40 MILLIGRAM(S): 80 TABLET, FILM COATED ORAL at 21:21

## 2025-05-09 RX ADMIN — IPRATROPIUM BROMIDE AND ALBUTEROL SULFATE 3 MILLILITER(S): .5; 2.5 SOLUTION RESPIRATORY (INHALATION) at 17:54

## 2025-05-09 RX ADMIN — Medication 255 MILLIGRAM(S): at 03:13

## 2025-05-09 RX ADMIN — INSULIN LISPRO 6: 100 INJECTION, SOLUTION INTRAVENOUS; SUBCUTANEOUS at 15:28

## 2025-05-09 RX ADMIN — IPRATROPIUM BROMIDE AND ALBUTEROL SULFATE 3 MILLILITER(S): .5; 2.5 SOLUTION RESPIRATORY (INHALATION) at 05:18

## 2025-05-09 RX ADMIN — FUROSEMIDE 40 MILLIGRAM(S): 10 INJECTION INTRAMUSCULAR; INTRAVENOUS at 03:13

## 2025-05-09 RX ADMIN — CEFTRIAXONE 100 MILLIGRAM(S): 500 INJECTION, POWDER, FOR SOLUTION INTRAMUSCULAR; INTRAVENOUS at 02:37

## 2025-05-09 RX ADMIN — IPRATROPIUM BROMIDE AND ALBUTEROL SULFATE 3 MILLILITER(S): .5; 2.5 SOLUTION RESPIRATORY (INHALATION) at 21:21

## 2025-05-09 NOTE — PATIENT PROFILE ADULT - NSTRANSFEREYEGLASSESPAIRS_GEN_A_NUR
opioid abuse, will monitor. Throughout reevals, pt opens eyes on verbal, follows commands, and answers questions. 1 pair

## 2025-05-09 NOTE — H&P ADULT - ASSESSMENT
85 yo F with PMHx of CHF. on oral diuretics, HTN, hypothyroidism, CAD, COPD/asthma, no h/o intubation and not on any home O2/CPAP, BIBA accompanied by  for worsening SOB/cough x 1 wk. Admitted for c/f COPD exacerbation. 85 yo F with PMHx of CHF. on oral diuretics, HTN, hypothyroidism, CAD, COPD/asthma, no h/o intubation and not on any home O2/CPAP, BIBA accompanied by  for worsening SOB/cough x 1 wk. Admitted for c/f asthma/COPD exacerbation iso underlying severe BREEZY.

## 2025-05-09 NOTE — PATIENT PROFILE ADULT - FALL HARM RISK - RISK INTERVENTIONS

## 2025-05-09 NOTE — H&P ADULT - NSHPLABSRESULTS_GEN_ALL_CORE
Radiology Follow-up
LABS:                          10.6   4.65  )-----------( 254      ( 09 May 2025 01:34 )             34.4     05-09    137  |  101  |  24[H]  ----------------------------<  183[H]  5.0   |  36[H]  |  0.79    Ca    9.7      09 May 2025 01:34    TPro  7.2  /  Alb  3.0[L]  /  TBili  0.5  /  DBili  x   /  AST  34  /  ALT  24  /  AlkPhos  154[H]  05-09    LIVER FUNCTIONS - ( 09 May 2025 01:34 )  Alb: 3.0 g/dL / Pro: 7.2 g/dL / ALK PHOS: 154 U/L / ALT: 24 U/L / AST: 34 U/L / GGT: x             Urinalysis Basic - ( 09 May 2025 01:34 )    Color: x / Appearance: x / SG: x / pH: x  Gluc: 183 mg/dL / Ketone: x  / Bili: x / Urobili: x   Blood: x / Protein: x / Nitrite: x   Leuk Esterase: x / RBC: x / WBC x   Sq Epi: x / Non Sq Epi: x / Bacteria: x

## 2025-05-09 NOTE — CONSULT NOTE ADULT - SUBJECTIVE AND OBJECTIVE BOX
HISTORY OF PRESENT ILLNESS  UDAY PARIS is a 83 yo F with PMHx of CHF. on oral diuretics, HTN, T2DM, hypothyroidism, CAD, COPD/asthma, no h/o intubation and not on any home O2/CPAP, BIBA accompanied by  for worsening SOB/cough x 1 wk. Per , pt has been feeling worsening SOB with cough productive of small white-yellow sputum and pain with coughing. R sided pain, worse on deep inspiration and palpation. Noted increased intermittent wheezing and has been using neb tx and albuterol pump more frequently. No fevers but did note chills at home.  reports significant snoring at home. Denies substernal chest pain, palpitations, abdominal pain, nausea, vomiting, diarrhea, dysuria, hematuria, LE swelling, or rash.    PMH: as above  PSH: hysterectomy  Meds: per surescripts (family unsure) ventolin neb, synthroid 137mcg, pantoprazole 40mg QD, Lasix 40mg QD, atorvastatin 40mg QHS, lisinopril 10mg QD, coreg 6.25mg BID, novolog (unsure dose)  Allergies: penicillin  FHx: non-contributory  SHx: never smoker, denies other drug or alcohol use    ED course:  S/p CTX 1g, azithromycin 500mg x 1, lasix 40mg IV x 1, solumedrol 125mg x 1, duoneb x 1  ED vitals: T 94.4 HR69 /87 RR:18  O2 94 on RA EKG:  Labs/imaging: HgB 10.6, HCO3 36, glucose 183. TSH 10.699, . ABG 7.42/61/93  CXR:-> bilateral opacities w R hilar enlargement no acute consolidation, no pleural effusion  Consults: ICU rejected, weaned off BiPAP    Admitted to [facility] for further management.      Endocrinology has been consulted for Diabetes Management.    DIABETES HISTORY  - Age at diagnosis:   - Diabetes managed outpatient by: Luis last in 2021   - Current Therapy:  - History of other regimens:   - Home glucose readings:  - History of DKA/HHS:   - Diabetic Complications:   - Diet:        FAMILY HISTORY  - Diabetes:    SOCIAL HISTORY  - Work:  - Alcohol:  - Smoking:      CURRENT MEDICATIONS  acetaminophen     Tablet .. 650 milliGRAM(s) Oral every 6 hours PRN  albuterol/ipratropium for Nebulization 3 milliLiter(s) Nebulizer every 4 hours  atorvastatin 40 milliGRAM(s) Oral at bedtime  dextrose 5%. 1000 milliLiter(s) IV Continuous <Continuous>  dextrose 5%. 1000 milliLiter(s) IV Continuous <Continuous>  dextrose 50% Injectable 25 Gram(s) IV Push once  dextrose 50% Injectable 12.5 Gram(s) IV Push once  dextrose 50% Injectable 25 Gram(s) IV Push once  dextrose Oral Gel 15 Gram(s) Oral once PRN  furosemide    Tablet 40 milliGRAM(s) Oral every 24 hours  glucagon  Injectable 1 milliGRAM(s) IntraMuscular once  insulin lispro (ADMELOG) corrective regimen sliding scale   SubCutaneous three times a day before meals  levothyroxine 150 MICROGram(s) Oral daily  lisinopril 10 milliGRAM(s) Oral daily  ondansetron Injectable 4 milliGRAM(s) IV Push every 8 hours PRN  pantoprazole    Tablet 40 milliGRAM(s) Oral before breakfast      REVIEW OF SYSTEMS  Constitutional:  Negative fever, chills   Cardiovascular:  Negative for chest pain or palpitations.  Respiratory:  Negative for cough, wheezing, or shortness of breath.   Gastrointestinal:  Negative for nausea, vomiting, diarrhea, constipation, or abdominal pain.  Genitourinary:  Negative frequency, urgency or dysuria.  Neurologic:  No headache, confusion, dizziness    PHYSICAL EXAM  Vital Signs Last 24 Hrs  T(C): 36.2 (09 May 2025 12:50), Max: 36.2 (09 May 2025 12:50)  T(F): 97.2 (09 May 2025 12:50), Max: 97.2 (09 May 2025 12:50)  HR: 87 (09 May 2025 12:50) (65 - 87)  BP: 128/58 (09 May 2025 12:50) (127/74 - 147/87)  BP(mean): 83 (09 May 2025 12:50) (83 - 91)  RR: 12 (09 May 2025 12:50) (12 - 18)  SpO2: 95% (09 May 2025 12:50) (94% - 100%)    Parameters below as of 09 May 2025 12:50  Patient On (Oxygen Delivery Method): nasal cannula  O2 Flow (L/min): 2    Constitutional: Awake, alert  HEENT: Normocephalic, atraumatic, JASE  Neck: supple, no acanthosis, no thyromegaly or palpable thyroid nodules.  Respiratory: Lungs clear to ausculation bilaterally.   Cardiovascular: regular rate and rhythm  GI: soft, non-tender, non-distended, bowel sounds present  Extremities: No lower extremity edema, peripheral pulses present.     LABS  CBC - WBC/HGB/HTC/PLT: 4.00/9.9/31.7/251 (05-09-25)  BMP: Na/K/Cl/Bicarb/BUN/Cr/Gluc: 137/4.5/95/36/24/0.71/255 (05-09-25)  Anion Gap: 6 (05-09-25)  eGFR: 84 (05-09-25)  Calcium: 9.3 (05-09-25)  Phosphorus: 3.8 (05-09-25)  Magnesium: 1.6 (05-09-25)  LFT - Alb/Tprot/Tbili/Dbili/AlkPhos/ALT/AST: 3.0/--/0.5/--/154/24/34 (05-09-25)    Thyroid Stimulating Hormone, Serum: 10.699 (05-09-25)    CBC - WBC/HGB/HTC/PLT: 4.00/9.9/31.7/251 (05-09-25)BMP: Na/K/Cl/Bicarb/BUN/Cr/Gluc: 137/4.5/95/36/24/0.71/255 (05-09-25)  Anion Gap: 6 (05-09-25)  eGFR: 84 (05-09-25)  Calcium: 9.3 (05-09-25)  Phosphorus: 3.8 (05-09-25)  Magnesium: 1.6 (05-09-25)    CAPILLARY BLOOD GLUCOSE & INSULIN RECEIVED          ASSESSMENT / RECOMMENDATIONS    A1C: 9.0 %  Creatinine: 0.71, GFR: 84  Weight: 110.7, BMI: 47.7    # Type 2 diabetes mellitus with hyperglycemia        #Hypothyroidism           Linda Rowe   Endocrinology Fellow    Service Pager: [phone]  HISTORY OF PRESENT ILLNESS  UDAY PARIS is a 83 yo F with PMHx of CHF. on oral diuretics, HTN, T2DM, hypothyroidism, CAD, COPD/asthma, no h/o intubation and not on any home O2/CPAP, BIBA accompanied by  for worsening SOB/cough x 1 wk. Per , pt has been feeling worsening SOB with cough productive of small white-yellow sputum and pain with coughing. R sided pain, worse on deep inspiration and palpation. Noted increased intermittent wheezing and has been using neb tx and albuterol pump more frequently. No fevers but did note chills at home.  reports significant snoring at home. Denies substernal chest pain, palpitations, abdominal pain, nausea, vomiting, diarrhea, dysuria, hematuria, LE swelling, or rash.    PMH: as above  PSH: hysterectomy  Meds: per surescripts (family unsure) ventolin neb, synthroid 137mcg, pantoprazole 40mg QD, Lasix 40mg QD, atorvastatin 40mg QHS, lisinopril 10mg QD, coreg 6.25mg BID, novolog (unsure dose)  Allergies: penicillin  FHx: non-contributory  SHx: never smoker, denies other drug or alcohol use    ED course:  S/p CTX 1g, azithromycin 500mg x 1, lasix 40mg IV x 1, solumedrol 125mg x 1, duoneb x 1  ED vitals: T 94.4 HR69 /87 RR:18  O2 94 on RA EKG:  Labs/imaging: HgB 10.6, HCO3 36, glucose 183. TSH 10.699, . ABG 7.42/61/93  CXR:-> bilateral opacities w R hilar enlargement no acute consolidation, no pleural effusion  Consults: ICU rejected, weaned off BiPAP    Admitted to [facility] for further management.      Endocrinology has been consulted for Diabetes Management.    DIABETES HISTORY  - Age at diagnosis: >20 years ago  - Diabetes managed outpatient by: Luis last in 2021, Now has another endocrinologist closer to home? She cannot remember the name   - Current Therapy: Novolog 70/30 12 units at 9 am and 12 units at 8pm.   - History of other regimens: metformin. Self discontinued due to GI symptoms.   - Home glucose readings: She checks twice a day am/pm. If  her FS is low 110s, she does not take insulin. IF her FS is > 200 she will take the insulin. Most mornings she states she wakes up with FS in 110-150. Sometimes in the afternoon, she has lows around 70s which occurs about once a month.   - History of DKA/HHS: Denies   - Diabetic Complications: Denies retinopathy. had bilateral cataracts States she does have numbness in her legs.   - Diet:    Breakfast: whole wheat toast, eggs, ham  Lunch: Mashed potato/ salad/ fish  Dinner: Brown rice/ fish   Desserts: "brown" ice cream  Fruit: 1-2x/ day: strawberry, naeem, pineapple, banana   Juice: apple or orange juice once a day.     FAMILY HISTORY  - Diabetes: grandmother    SOCIAL HISTORY  - Work:  Does not work,. lives with grandchildren. uses walker. 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   - Alcohol: Denies  - Smoking: Denies       CURRENT MEDICATIONS  acetaminophen     Tablet .. 650 milliGRAM(s) Oral every 6 hours PRN  albuterol/ipratropium for Nebulization 3 milliLiter(s) Nebulizer every 4 hours  atorvastatin 40 milliGRAM(s) Oral at bedtime  dextrose 5%. 1000 milliLiter(s) IV Continuous <Continuous>  dextrose 5%. 1000 milliLiter(s) IV Continuous <Continuous>  dextrose 50% Injectable 25 Gram(s) IV Push once  dextrose 50% Injectable 12.5 Gram(s) IV Push once  dextrose 50% Injectable 25 Gram(s) IV Push once  dextrose Oral Gel 15 Gram(s) Oral once PRN  furosemide    Tablet 40 milliGRAM(s) Oral every 24 hours  glucagon  Injectable 1 milliGRAM(s) IntraMuscular once  insulin lispro (ADMELOG) corrective regimen sliding scale   SubCutaneous three times a day before meals  levothyroxine 150 MICROGram(s) Oral daily  lisinopril 10 milliGRAM(s) Oral daily  ondansetron Injectable 4 milliGRAM(s) IV Push every 8 hours PRN  pantoprazole    Tablet 40 milliGRAM(s) Oral before breakfast      REVIEW OF SYSTEMS  Constitutional:  Negative fever, chills   Cardiovascular:  Negative for chest pain or palpitations.  Respiratory:  Negative for cough, wheezing, or shortness of breath.   Gastrointestinal:  Negative for nausea, vomiting, diarrhea, constipation, or abdominal pain.  Genitourinary:  Negative frequency, urgency or dysuria.  Neurologic:  No headache, confusion, dizziness    PHYSICAL EXAM  Vital Signs Last 24 Hrs  T(C): 36.2 (09 May 2025 12:50), Max: 36.2 (09 May 2025 12:50)  T(F): 97.2 (09 May 2025 12:50), Max: 97.2 (09 May 2025 12:50)  HR: 87 (09 May 2025 12:50) (65 - 87)  BP: 128/58 (09 May 2025 12:50) (127/74 - 147/87)  BP(mean): 83 (09 May 2025 12:50) (83 - 91)  RR: 12 (09 May 2025 12:50) (12 - 18)  SpO2: 95% (09 May 2025 12:50) (94% - 100%)    Parameters below as of 09 May 2025 12:50  Patient On (Oxygen Delivery Method): nasal cannula  O2 Flow (L/min): 2    Constitutional: Awake, alert  HEENT: Normocephalic, atraumatic, JASE  Neck: supple, no acanthosis, no thyromegaly or palpable thyroid nodules.  Respiratory: Lungs clear to ausculation bilaterally.   Cardiovascular: regular rate and rhythm  GI: soft, non-tender, non-distended, bowel sounds present  Extremities: No lower extremity edema, peripheral pulses present.     LABS  CBC - WBC/HGB/HTC/PLT: 4.00/9.9/31.7/251 (05-09-25)  BMP: Na/K/Cl/Bicarb/BUN/Cr/Gluc: 137/4.5/95/36/24/0.71/255 (05-09-25)  Anion Gap: 6 (05-09-25)  eGFR: 84 (05-09-25)  Calcium: 9.3 (05-09-25)  Phosphorus: 3.8 (05-09-25)  Magnesium: 1.6 (05-09-25)  LFT - Alb/Tprot/Tbili/Dbili/AlkPhos/ALT/AST: 3.0/--/0.5/--/154/24/34 (05-09-25)    Thyroid Stimulating Hormone, Serum: 10.699 (05-09-25)    CBC - WBC/HGB/HTC/PLT: 4.00/9.9/31.7/251 (05-09-25)BMP: Na/K/Cl/Bicarb/BUN/Cr/Gluc: 137/4.5/95/36/24/0.71/255 (05-09-25)  Anion Gap: 6 (05-09-25)  eGFR: 84 (05-09-25)  Calcium: 9.3 (05-09-25)  Phosphorus: 3.8 (05-09-25)  Magnesium: 1.6 (05-09-25)    CAPILLARY BLOOD GLUCOSE & INSULIN RECEIVED      ASSESSMENT / RECOMMENDATIONS  UDAY PARIS is a 83 yo F with PMHx of CHF. on oral diuretics, HTN, T2DM, hypothyroidism, CAD, COPD/asthma, no h/o intubation and not on any home O2/CPAP, BIBA accompanied by  for worsening SOB/cough x 1 wk. Hypercapnic on admission, no off Bipap and on NC. Mental status improving. Endocrinology consulted for diabetes management and hypothyroidism. She is currently on prednisone 40mg qd as well.     A1C: 9.0 %  Creatinine: 0.71, GFR: 84  Weight: 110.7, BMI: 47.7    #Uncontrolled Type 2 diabetes mellitus with hyperglycemia 2/2 steroids.   - Carb consistent diet   - Start lantus 16 units at bedtime  - Start lispro 8 units before meals TID  - Moderate insulin sliding scale   - FS goal 100-180  - Discharge recs TBD. She refuses starting any GLP1 like Mounjaro and only wants Novolog 70/30 insulin.   - She can follow up with her own endocrinologist or with [facility] physician partners by calling [phone] to make an appointment with Dr. Smith.     #Hypothyroidism   - doubt mental status and one episode of hypothermia is related to hypothyroid   - TSH 10.669. Repeat TSH 4.24, Free T4 1.39 (TSH suppressed after steroid)   - Her last TSH was in 3/2021 while levothyroxine 137mcg qd was 3.26. She is compliant with levothyroxine but takes all her medications together in the morning.   - Increase levothyroxine to 150mcg qd in am   - Advised to take in the morning on an empty stomach at least 30 minutes before other medications and food.     Discussed with Dr. Kat Rowe   Endocrinology Fellow    Service Pager: [phone]

## 2025-05-09 NOTE — H&P ADULT - PROBLEM SELECTOR PLAN 2
Home meds: carvedilol 6.25mg BID    - c/w home meds Home meds: carvedilol 6.25mg BID, lisinopril 10mg QD    - c/w home lisinopril  - hold home coreg iso asthma exacerbation 2/4 SIRS criteria - hypothermic and tachypnea (not in flowsheets), bilateral lung opacities.  - CAP coverage as above

## 2025-05-09 NOTE — ED PROVIDER NOTE - SECONDARY DIAGNOSIS.
Cushing syndrome Atypical pneumonia COPD exacerbation Acute on chronic respiratory failure with hypercapnia

## 2025-05-09 NOTE — H&P ADULT - HISTORY OF PRESENT ILLNESS
Problem: Physical Therapy Goal  Goal: Physical Therapy Goal  Description: 1. Pt to be mod I with bed mobility.  2. Pt to transfer with bed to chair with SBA.  3. Pt to propel W/C x 50'  4. B LE ther ex per handout x 10 reps with assistance as needed  Outcome: Ongoing, Progressing      83 yo F with PMHx of CHF. on oral diuretics, HTN, hypothyroidism, CAD, COPD/asthma, no h/o intubation and not on any home O2/CPAP, BIBA accompanied by  for worsening SOB/cough x 1 wk. Per , pt has been feeling worsening SOB with cough productive of yellowish sputum and pain with coughing. Noted increased intermittent wheezing and has been using neb tx and albuterol pump more frequently  Denies fever, chills, chest pain, palpitations,abdominal pain, nausea, vomiting, diarrhea, dysuria, hematuria, LE swelling, or rash.    PMH:   PSH:  Meds:  Allergies:  FHx:  SHx:     ED course:  S/p CTX 1g, azithromycin 500mg x 1, lasix 40mg IV x 1, solumedrol 125mg x 1, duoneb x 1  ED vitals: T 94.4 HR69 /87 RR:18  O2 94 on RA EKG:  Labs/imaging: HgB 10.6, HCO3 36, glucose 183. TSH 10.699, . ABG 7.42/61/93  CXR:-> bilateral opacities w R hilar enlargement no acute consolidation, no pleural effusion  Consults: ICU rejected, weaned off BiPAP 85 yo F with PMHx of CHF. on oral diuretics, HTN, T2DM, hypothyroidism, CAD, COPD/asthma, no h/o intubation and not on any home O2/CPAP, BIBA accompanied by  for worsening SOB/cough x 1 wk. Per , pt has been feeling worsening SOB with cough productive of yellowish sputum and pain with coughing. R sided pain, worse on deep inspiration and palpation. Noted increased intermittent wheezing and has been using neb tx and albuterol pump more frequently. No fevers but did note chills at home.  reports significant snoring at home. Denies substernal chest pain, palpitations, abdominal pain, nausea, vomiting, diarrhea, dysuria, hematuria, LE swelling, or rash.    PMH: as above  PSH: hysterectomy  Meds: per surescripts (family unsure)  Allergies: NKDA  FHx: non-contributory  SHx: never smoker, denies other drug or alcohol use    ED course:  S/p CTX 1g, azithromycin 500mg x 1, lasix 40mg IV x 1, solumedrol 125mg x 1, duoneb x 1  ED vitals: T 94.4 HR69 /87 RR:18  O2 94 on RA EKG:  Labs/imaging: HgB 10.6, HCO3 36, glucose 183. TSH 10.699, . ABG 7.42/61/93  CXR:-> bilateral opacities w R hilar enlargement no acute consolidation, no pleural effusion  Consults: ICU rejected, weaned off BiPAP 85 yo F with PMHx of CHF. on oral diuretics, HTN, T2DM, hypothyroidism, CAD, COPD/asthma, no h/o intubation and not on any home O2/CPAP, BIBA accompanied by  for worsening SOB/cough x 1 wk. Per , pt has been feeling worsening SOB with cough productive of small white-yellow sputum and pain with coughing. R sided pain, worse on deep inspiration and palpation. Noted increased intermittent wheezing and has been using neb tx and albuterol pump more frequently. No fevers but did note chills at home.  reports significant snoring at home. Denies substernal chest pain, palpitations, abdominal pain, nausea, vomiting, diarrhea, dysuria, hematuria, LE swelling, or rash.    PMH: as above  PSH: hysterectomy  Meds: per surescripts (family unsure) ventolin neb, synthroid 137mcg, pantoprazole 40mg QD, Lasix 40mg QD, atorvastatin 40mg QHS, lisinopril 10mg QD, coreg 6.25mg BID, novolog (unsure dose)  Allergies: penicillin  FHx: non-contributory  SHx: never smoker, denies other drug or alcohol use    ED course:  S/p CTX 1g, azithromycin 500mg x 1, lasix 40mg IV x 1, solumedrol 125mg x 1, duoneb x 1  ED vitals: T 94.4 HR69 /87 RR:18  O2 94 on RA EKG:  Labs/imaging: HgB 10.6, HCO3 36, glucose 183. TSH 10.699, . ABG 7.42/61/93  CXR:-> bilateral opacities w R hilar enlargement no acute consolidation, no pleural effusion  Consults: ICU rejected, weaned off BiPAP    Admitted to [facility] for further management.

## 2025-05-09 NOTE — ED ADULT TRIAGE NOTE - CHIEF COMPLAINT QUOTE
BIBA for SOB and CP. Pt. has been feeling increasingly SOB x 1 week, using her nebulizer and inhaler more often. Also c/o intermittent CP and dry cough x 2 weeks.

## 2025-05-09 NOTE — H&P ADULT - PROBLEM SELECTOR PLAN 8
F: as needed  E: K<4 Mg<2  N: carb consistent diet  A: as tolerated  GI ppx: none  DVT ppx: Lovenox 40mg subQ  Code: full  Dispo: DYANA Home meds: pantoprazole 40mg QD    - continue home meds

## 2025-05-09 NOTE — H&P ADULT - PROBLEM SELECTOR PLAN 9
F: as needed  E: K<4 Mg<2  N: carb consistent diet  A: as tolerated  GI ppx: none  DVT ppx: Lovenox 40mg subQ  Code: full  Dispo: DYANA

## 2025-05-09 NOTE — H&P ADULT - PROBLEM SELECTOR PLAN 3
TSH elevated, presented hypothermic,  On home synthroid 137mcg     - increase to 150mcg TSH elevated, presented hypothermic  May be iso infection however given elevated TSH will increase home dose  On home synthroid 137mcg     - increase to 150mcg Home meds: carvedilol 6.25mg BID, lisinopril 10mg QD    - c/w home lisinopril  - hold home coreg iso asthma exacerbation

## 2025-05-09 NOTE — ED PROVIDER NOTE - OBJECTIVE STATEMENT
83 yo F with PMHx of CHF. on oral diuretics, HTN, hypothyroidism, CAD, COPD/asthma, no h/o intubation and not on any home O2/CPAP, BIBA accompanied by  for worsening SOB/cough x 1 wk. Per , pt has been feeling worsening SOB with cough productive of yellowish sputum and pain with coughing. Noted increased intermittent wheezing and has been using neb tx and albuterol pump more frequently. Denies fever, chills, hemoptysis orthopnea, palpitations, stridors, focal weakness, sore throat, tinnitus, HA, dizziness, N/V/D/C, abdominal pain, change in urinary/bowel function, night sweats, and malaise.

## 2025-05-09 NOTE — ED PROVIDER NOTE - PROGRESS NOTE DETAILS
serial VBG with stable CO2, likely chronic retainer, TSH also markedly elevated and low core temp, possible cushing vs myxedema. pt taken off bipap and SpO2 now stable on 2L, 99%. Will admit to [facility], CTC paged awaiting call back from intensivist Dr. Doran for SDU vs regional admission pt placed on bipap for increased wob and wheezing/hypercapnia, 15/5, RR 12, FiO2 of 50% and will continue to monitor and serial reassessments case discussed with Dr. Doran, downgraded to hospitalist service w remote tele. Sign out provided to hospitalist Dr. Walsh to [facility] w remote tele for the management of the following condition   - atypical PNA/COPD w acute on chronic respiratory hypercapnia   - cushing syndrome / severe hypothyroidism serial VBG with stable CO2, likely chronic retainer, TSH also markedly elevated and low core temp (94.4F NE), possible cushing vs myxedema. pt taken off bipap and SpO2 now stable on 2L, 99%. currently on savana [place],  Will admit to [facility], CTC paged awaiting call back from intensivist Dr. Doran for SDU vs regional admission pt placed on bipap for increased wob and wheezing/hypercapnia, 15/5, RR 12, FiO2 of 50% and will continue to monitor and serial reassessments.   also unable to obtain oral temp on pt, skin is warm to touch, pt refused rectal temp multiple times, will attempt again later

## 2025-05-09 NOTE — H&P ADULT - PROBLEM SELECTOR PLAN 4
Home meds: atorvastatin 40mg QD    - continue home meds TSH elevated, presented hypothermic  May be iso infection however given elevated TSH will increase home dose  On home synthroid 137mcg     - increase to 150mcg

## 2025-05-09 NOTE — ED PROVIDER NOTE - CLINICAL SUMMARY MEDICAL DECISION MAKING FREE TEXT BOX
85 yo F with PMHx of CHF. on oral diuretics, HTN, hypothyroidism, CAD, COPD/asthma, no h/o intubation and not on any home O2/CPAP, BIBA accompanied by  for worsening SOB/cough x 1 wk. Per , pt has been feeling worsening SOB with cough productive of yellowish sputum and pain with coughing. Noted increased intermittent wheezing and has been using neb tx and albuterol pump more frequently   DDx including but not limited to the following -   copd/chf exacerbation, PNA, metabolic/electrolyte derangement, ACS, thyroid condition, r/o myxedema coma     plan - pan culture, including blood, urine, viral swab for flu/covid/rsv, and cxr, labs (including cbc, cmp, LA, crp, VBG), ekg, cardiac monitoring, empiric abx, 83 yo F with PMHx of CHF. on oral diuretics, HTN, hypothyroidism, CAD, COPD/asthma, no h/o intubation and not on any home O2/CPAP, BIBA accompanied by  for worsening SOB/cough x 1 wk. Per , pt has been feeling worsening SOB with cough productive of yellowish sputum and pain with coughing. Noted increased intermittent wheezing and has been using neb tx and albuterol pump more frequently   DDx including but not limited to the following -   copd/chf exacerbation, PNA, metabolic/electrolyte derangement, ACS, thyroid condition, r/o myxedema coma     plan - pan culture, including blood, urine, viral swab for flu/covid/rsv, and cxr, labs (including cbc, cmp, LA, crp, VBG), ekg, cardiac monitoring, empiric abx, supportive care, serial nebs/diuresis, strict I&Os, and reassess

## 2025-05-09 NOTE — ED PROVIDER NOTE - CARE PLAN
Principal Discharge DX:	Atypical pneumonia   1 Principal Discharge DX:	Acute on chronic respiratory failure with hypercapnia  Secondary Diagnosis:	Atypical pneumonia  Secondary Diagnosis:	COPD exacerbation  Secondary Diagnosis:	Cushing syndrome   Principal Discharge DX:	Cushing syndrome  Secondary Diagnosis:	Atypical pneumonia  Secondary Diagnosis:	COPD exacerbation  Secondary Diagnosis:	Acute on chronic respiratory failure with hypercapnia

## 2025-05-09 NOTE — ED PROVIDER NOTE - CADM POA PRESS ULCER
No
No. ARLEN screening performed.  STOP BANG Legend: 0-2 = LOW Risk; 3-4 = INTERMEDIATE Risk; 5-8 = HIGH Risk

## 2025-05-09 NOTE — CONSULT NOTE ADULT - SUBJECTIVE AND OBJECTIVE BOX
PULMONARY SERVICE INITIAL CONSULT NOTE    HPI:  85 yo F with PMHx of CHF. on oral diuretics, HTN, T2DM, hypothyroidism, CAD, COPD/asthma, no h/o intubation and not on any home O2/CPAP, BIBA accompanied by  for worsening SOB/cough x 1 wk. Per , pt has been feeling worsening SOB with cough productive of small white-yellow sputum and pain with coughing. R sided pain, worse on deep inspiration and palpation. Noted increased intermittent wheezing and has been using neb tx and albuterol pump more frequently. No fevers but did note chills at home.  reports significant snoring at home. Denies substernal chest pain, palpitations, abdominal pain, nausea, vomiting, diarrhea, dysuria, hematuria, LE swelling, or rash.    PMH: as above  PSH: hysterectomy  Meds: per surescripts (family unsure) ventolin neb, synthroid 137mcg, pantoprazole 40mg QD, Lasix 40mg QD, atorvastatin 40mg QHS, lisinopril 10mg QD, coreg 6.25mg BID, novolog (unsure dose)  Allergies: penicillin  FHx: non-contributory  SHx: never smoker, denies other drug or alcohol use    ED course:  S/p CTX 1g, azithromycin 500mg x 1, lasix 40mg IV x 1, solumedrol 125mg x 1, duoneb x 1  ED vitals: T 94.4 HR69 /87 RR:18  O2 94 on RA EKG:  Labs/imaging: HgB 10.6, HCO3 36, glucose 183. TSH 10.699, . ABG 7.42/61/93  CXR:-> bilateral opacities w R hilar enlargement no acute consolidation, no pleural effusion  Consults: ICU rejected, weaned off BiPAP    Admitted to [facility] for further management. (09 May 2025 09:21)      Pulmonary is consulted for    Follows with  __ outpatient for Pulmonary.     REVIEW OF SYSTEMS:  10 point ROS negative aside from what is mentioned in HPI    PAST MEDICAL & SURGICAL HISTORY:  Diabetes type 2      Asthma      HTN (hypertension)      Hyperlipidemia      Chronic systolic congestive heart failure      H/O: hysterectomy          FAMILY HISTORY:      SOCIAL HISTORY:  Smoking Status: [ ] Current, [ ] Former, [ ] Never  Pack Years:    MEDICATIONS:  Pulmonary:  albuterol/ipratropium for Nebulization 3 milliLiter(s) Nebulizer every 4 hours    Antimicrobials:    Anticoagulants:  enoxaparin Injectable 40 milliGRAM(s) SubCutaneous every 12 hours    Onc:    GI/:  pantoprazole    Tablet 40 milliGRAM(s) Oral before breakfast    Endocrine:  atorvastatin 40 milliGRAM(s) Oral at bedtime  dextrose 50% Injectable 25 Gram(s) IV Push once  dextrose 50% Injectable 12.5 Gram(s) IV Push once  dextrose 50% Injectable 25 Gram(s) IV Push once  dextrose Oral Gel 15 Gram(s) Oral once PRN  glucagon  Injectable 1 milliGRAM(s) IntraMuscular once  insulin glargine Injectable (LANTUS) 16 Unit(s) SubCutaneous at bedtime  insulin lispro Injectable (ADMELOG) 8 Unit(s) SubCutaneous three times a day before meals    Cardiac:  lisinopril 10 milliGRAM(s) Oral daily    Other Medications:  acetaminophen     Tablet .. 650 milliGRAM(s) Oral every 6 hours PRN  dextrose 5%. 1000 milliLiter(s) IV Continuous <Continuous>  dextrose 5%. 1000 milliLiter(s) IV Continuous <Continuous>  ondansetron Injectable 4 milliGRAM(s) IV Push every 8 hours PRN      Allergies    Shrimp (Unknown)  penicillin (Unknown)    Intolerances        Vital Signs Last 24 Hrs  T(C): 36.4 (09 May 2025 14:50), Max: 36.4 (09 May 2025 14:50)  T(F): 97.6 (09 May 2025 14:50), Max: 97.6 (09 May 2025 14:50)  HR: 75 (09 May 2025 16:47) (65 - 87)  BP: 104/51 (09 May 2025 16:47) (104/51 - 147/87)  BP(mean): 74 (09 May 2025 16:47) (74 - 91)  RR: 18 (09 May 2025 16:47) (12 - 21)  SpO2: 99% (09 May 2025 16:47) (94% - 100%)    Parameters below as of 09 May 2025 16:47  Patient On (Oxygen Delivery Method): nasal cannula  O2 Flow (L/min): 2      05-09 @ 07:01  -  05-09 @ 18:52  --------------------------------------------------------  IN: 0 mL / OUT: 400 mL / NET: -400 mL            PHYSICAL EXAM:  Constitutional: well-appearing, in no apparent distress  Head: NC/AT  EENT: PERRL, anicteric sclera; oropharynx clear with moist mucous membranes  Neck: supple, ROM intact, no appreciable JVD or LAD  Respiratory: Lungs clear to auscultation bilaterally, no wheezes, rhonchi or rales  Cardiovascular: +S1/S2 intact, regular rhythm and rate. No murmurs appreciated  Gastrointestinal: soft, non-tender, non distended, bowel sounds normoactive   Extremities: no edema, clubbing or cyanosis  Vascular: 2+ radial pulses B/L  Neurological: awake and alert; oriented with no appreciable focal neuro defecits    LABS:  ABG - ( 09 May 2025 04:42 )  pH, Arterial: 7.42  pH, Blood: x     /  pCO2: 61    /  pO2: 93    / HCO3: x     / Base Excess: x     /  SaO2: 95.6                CBC Full  -  ( 09 May 2025 12:32 )  WBC Count : 4.00 K/uL  RBC Count : 3.56 M/uL  Hemoglobin : 9.9 g/dL  Hematocrit : 31.7 %  Platelet Count - Automated : 251 K/uL  Mean Cell Volume : 89.0 fl  Mean Cell Hemoglobin : 27.8 pg  Mean Cell Hemoglobin Concentration : 31.2 g/dL  Auto Neutrophil # : x  Auto Lymphocyte # : x  Auto Monocyte # : x  Auto Eosinophil # : x  Auto Basophil # : x  Auto Neutrophil % : x  Auto Lymphocyte % : x  Auto Monocyte % : x  Auto Eosinophil % : x  Auto Basophil % : x    05-09    137  |  95[L]  |  24[H]  ----------------------------<  255[H]  4.5   |  36[H]  |  0.71    Ca    9.3      09 May 2025 12:32  Phos  3.8     05-09  Mg     1.6     05-09    TPro  7.2  /  Alb  3.0[L]  /  TBili  0.5  /  DBili  x   /  AST  34  /  ALT  24  /  AlkPhos  154[H]  05-09                      RADIOLOGY & ADDITIONAL STUDIES:    reviewed PULMONARY SERVICE INITIAL CONSULT NOTE    HPI:  84 year old female with CHF, HTN, T2DM, hypothyroidism, CAD, asthma, and obesity who was brought in for shortness of breath and cough for one week. Cough was associated with small volume white/yellow phelgm. Also endorsing inspiratory chest pain. Patient reports increased intermittent wheezing and more frequent use of her albuterol inhaler and nebulizers.  reports significant snoring at home. Denies substernal chest pain, palpitations, abdominal pain, nausea, vomiting, diarrhea, dysuria, hematuria, or rash.    ED course:  S/p CTX 1g, azithromycin 500mg x 1, lasix 40mg IV x 1, solumedrol 125mg x 1, duoneb x 1  ED vitals: T 94.4 HR69 /87 RR:18  O2 94 on RA EKG:  Labs/imaging: HgB 10.6, HCO3 36, glucose 183. TSH 10.699, . ABG 7.42/61/93  CXR:-> bilateral opacities w R hilar enlargement no acute consolidation, no pleural effusion  Consults: ICU rejected, weaned off BiPAP      Pulmonary is consulted for hypoxic/hypercapnic respiratory failure    Pulmonary history:  Patient is a lifetime nonsmoker. Only pulmonary history is asthma for which she uses albuterol inhaler and nebulizers daily. She reports being diagnosed with asthma as an adult, but cannot recall getting PFTs. She is unsure if she is followed by a pulmonologist.   She has never been hospitalized for an asthma exacerbation and has never required intubation.  Patient endorses a sedentary lifestyle, reports shortness of breath with any exertion. She requires help from her  for most ADLs. She states having LE edema and 1-2 pillow orthopnea.     Interview done using OTP : [id number]    REVIEW OF SYSTEMS:  10 point ROS negative aside from what is mentioned in HPI    PAST MEDICAL & SURGICAL HISTORY:  Diabetes type 2      Asthma      HTN (hypertension)      Hyperlipidemia      Chronic systolic congestive heart failure      H/O: hysterectomy          FAMILY HISTORY:      SOCIAL HISTORY:  Smoking Status: [ ] Current, [ ] Former, [ X ] Never      MEDICATIONS:  Pulmonary:  albuterol/ipratropium for Nebulization 3 milliLiter(s) Nebulizer every 4 hours    Antimicrobials:    Anticoagulants:  enoxaparin Injectable 40 milliGRAM(s) SubCutaneous every 12 hours    Onc:    GI/:  pantoprazole    Tablet 40 milliGRAM(s) Oral before breakfast    Endocrine:  atorvastatin 40 milliGRAM(s) Oral at bedtime  dextrose 50% Injectable 25 Gram(s) IV Push once  dextrose 50% Injectable 12.5 Gram(s) IV Push once  dextrose 50% Injectable 25 Gram(s) IV Push once  dextrose Oral Gel 15 Gram(s) Oral once PRN  glucagon  Injectable 1 milliGRAM(s) IntraMuscular once  insulin glargine Injectable (LANTUS) 16 Unit(s) SubCutaneous at bedtime  insulin lispro Injectable (ADMELOG) 8 Unit(s) SubCutaneous three times a day before meals    Cardiac:  lisinopril 10 milliGRAM(s) Oral daily    Other Medications:  acetaminophen     Tablet .. 650 milliGRAM(s) Oral every 6 hours PRN  dextrose 5%. 1000 milliLiter(s) IV Continuous <Continuous>  dextrose 5%. 1000 milliLiter(s) IV Continuous <Continuous>  ondansetron Injectable 4 milliGRAM(s) IV Push every 8 hours PRN      Allergies    Shrimp (Unknown)  penicillin (Unknown)    Intolerances        Vital Signs Last 24 Hrs  T(C): 36.4 (09 May 2025 14:50), Max: 36.4 (09 May 2025 14:50)  T(F): 97.6 (09 May 2025 14:50), Max: 97.6 (09 May 2025 14:50)  HR: 75 (09 May 2025 16:47) (65 - 87)  BP: 104/51 (09 May 2025 16:47) (104/51 - 147/87)  BP(mean): 74 (09 May 2025 16:47) (74 - 91)  RR: 18 (09 May 2025 16:47) (12 - 21)  SpO2: 99% (09 May 2025 16:47) (94% - 100%)    Parameters below as of 09 May 2025 16:47  Patient On (Oxygen Delivery Method): nasal cannula  O2 Flow (L/min): 2      05-09 @ 07:01  -  05-09 @ 18:52  --------------------------------------------------------  IN: 0 mL / OUT: 400 mL / NET: -400 mL            PHYSICAL EXAM:  Constitutional: obese female; well appearing in no respiratory distress  Head: NCAT  EENT: anicteric sclera; oropharynx clear with moist mucous membranes  Neck: supple, ROM intact, no appreciable JVD  Respiratory:  on 2L NC saturating %, weaned to RA and saturations decreased to 93%. Bilateral lungs with distant lung sounds likely 2/2 habitus. No overt wheezing  Cardiovascular: +S1/S2 intact, regular rhythm and rate  Gastrointestinal: soft, non-tender, bowel sounds normoactive   Extremities: trace LE edema at the ankles  Neurological: awake and alert; conversational         LABS:  ABG - ( 09 May 2025 04:42 )  pH, Arterial: 7.42  pH, Blood: x     /  pCO2: 61    /  pO2: 93    / HCO3: x     / Base Excess: x     /  SaO2: 95.6          CBC Full  -  ( 09 May 2025 12:32 )  WBC Count : 4.00 K/uL  RBC Count : 3.56 M/uL  Hemoglobin : 9.9 g/dL  Hematocrit : 31.7 %  Platelet Count - Automated : 251 K/uL  Mean Cell Volume : 89.0 fl  Mean Cell Hemoglobin : 27.8 pg  Mean Cell Hemoglobin Concentration : 31.2 g/dL  Auto Neutrophil # : x  Auto Lymphocyte # : x  Auto Monocyte # : x  Auto Eosinophil # : x  Auto Basophil # : x  Auto Neutrophil % : x  Auto Lymphocyte % : x  Auto Monocyte % : x  Auto Eosinophil % : x  Auto Basophil % : x    05-09    137  |  95[L]  |  24[H]  ----------------------------<  255[H]  4.5   |  36[H]  |  0.71    Ca    9.3      09 May 2025 12:32  Phos  3.8     05-09  Mg     1.6     05-09    TPro  7.2  /  Alb  3.0[L]  /  TBili  0.5  /  DBili  x   /  AST  34  /  ALT  24  /  AlkPhos  154[H]  05-09          RADIOLOGY & ADDITIONAL STUDIES:  < from: CT Angio Chest PE Protocol w/ IV Cont (05.09.25 @ 14:19) >  ACC: [id number] EXAM:  CT ANGIO CHEST PULM ART WAWIC   ORDERED BY: GELA FALCON     PROCEDURE DATE:  05/09/2025          INTERPRETATION:  CLINICAL INFORMATION: Shortness of breath    COMPARISON: None.    CONTRAST/COMPLICATIONS:  IV Contrast: [id number]  70 cc administered   30 cc discarded  Oral Contrast: NONE  .    PROCEDURE:  CT Angiography of the Chest.  Sagittal and coronal reformats were performed as well as 3D (MIP)   reconstructions.    FINDINGS:    LUNGS AND LARGE AIRWAYS: Patent central airways. Mild dependent   atelectasis. No pulmonary consolidation or suspicious nodules.  PLEURA: No significant pleural effusion.  VESSELS: Images are degraded by respiratory motion and patient's large   body habitus. Artifact from upper extremities further limits the   evaluation. No central pulmonary emboli are seen. Apparent hypodensity of   branches of pulmonary artery to the right lower lobe, probably   artifactual.  HEART: Heart size is normal. No pericardial effusion.  MEDIASTINUM AND PEDRO: No lymphadenopathy.  CHEST WALL AND LOWER NECK: Within normal limits.  VISUALIZED UPPER ABDOMEN: Small Cholelithiasis. Left renal cyst.  BONES: Degenerative changes..    IMPRESSION:  Study limited by respiratory motion, large body habitus and suboptimal   positioning. No central pulmonary emboli visualized.    --- End of Report ---    < end of copied text >

## 2025-05-09 NOTE — H&P ADULT - ATTENDING COMMENTS
84F with reported history of CHF (unclear etiology or type) on diuretics, HTN, hypoTh, CAD without prior stents, asthma without prior intubations or hospitalizations, who presented for 1 week of SOB and productive cough, admitted for acute on chronic mixed hypoxic hypercapneic respiratory failure.    Initially required BiPAP in ED but now weaned to NC.  On exam patient initially was severely anxious, improved following nebulizers and SpO2 high 90s on 2LNC without tachypnea or increased WOB. Lung sounds are difficult to appreciate 2/2 body habitus, otherwise RRR. WWP without significant peripheral edema.    #hypoxic hypercarbic respiratory failure, acute on chronic  #severe sepsis (hypothermia, tachypnea, need for NIPPV) #community-acquired PNA  #OHS/BREEZY  #HTN #hypoTh #DM2  #reported CHF  #class 3 obesity - affects all aspects of care  - unclear medical history, patient and  both are unsure of her exact diagnoses, has not had medical follow up in years  - patient has no prior history of smoking or environmental exposures  - appears to have chronic CO2 retention likely iso BREEZY and OHS, unclear if there is also a component of asthma/COPD  - obtain CTA chest to r/o PE and eval bilateral opacities/R hilar enlargement  - CAP coverage with CTX/azithro for now, duonebs, prednisone (s/p methylpred 125 today)  - f/u RVP, procal, urine strep/legionella  - TTE to eval cardiac function, trop and proBNP are normal though BNP likely underestimated iso obesity  - Pulm consulted  - BiPAP on standby, will need nocturnal BiPAP  - monitor FSG needs on prednisone, start basal/bolus insulin pending trends 84F with reported history of CHF (unclear etiology or type) on diuretics, HTN, hypoTh, CAD without prior stents, asthma without prior intubations or hospitalizations, who presented for 1 week of SOB and productive cough, admitted for acute on chronic mixed hypoxic hypercapneic respiratory failure.    Initially required BiPAP in ED but now weaned to NC.  On exam patient initially was severely anxious, improved following nebulizers and SpO2 high 90s on 2LNC without tachypnea or increased WOB. Lung sounds are difficult to appreciate 2/2 body habitus, otherwise RRR. WWP without significant peripheral edema.    #hypoxic hypercarbic respiratory failure, acute on chronic  #severe sepsis (hypothermia, tachypnea, need for NIPPV) #community-acquired PNA  #OHS/BREEZY  #HTN #hypoTh #DM2  #reported CHF  #class 3 obesity - affects all aspects of care  - unclear medical history, patient and  both are unsure of her exact diagnoses, has not had medical follow up in years  - patient has no prior history of smoking or environmental exposures  - appears to have chronic CO2 retention likely iso BREEZY and OHS, unclear if there is also a component of asthma/COPD  - obtain CTA chest to r/o PE and eval bilateral opacities/R hilar enlargement  - CAP coverage with CTX/azithro for now, duonebs, prednisone (s/p methylpred 125 today)  - f/u RVP, procal, urine strep/legionella  - TTE to eval cardiac function, trop and proBNP are normal though BNP likely underestimated iso obesity  - Pulm consulted  - BiPAP on standby, will need nocturnal BiPAP  - monitor FSG needs on prednisone, start basal/bolus insulin pending trends  - Endo consult for evaluation of hypothyroidism given hypothermia, hypoventilation (chronic), obesity   - TSH >10 but may be low in the setting of sepsis, f/u FT4 84F with reported history of CHF (unclear etiology or type) on diuretics, HTN, hypoTh, CAD without prior stents, asthma without prior intubations or hospitalizations, class 3 obesity (BMI 47), who presented for 1 week of SOB and productive cough, admitted for acute on chronic mixed hypoxic hypercapneic respiratory failure.    Initially required BiPAP in ED but now weaned to NC.  On exam patient initially was severely anxious, improved following nebulizers and SpO2 high 90s on 2LNC without tachypnea or increased WOB. Lung sounds are difficult to appreciate 2/2 body habitus, otherwise RRR. WWP without significant peripheral edema.    #hypoxic hypercarbic respiratory failure, acute on chronic  #severe sepsis (hypothermia, tachypnea, need for NIPPV) #community-acquired PNA  #OHS/BREEZY  #HTN #hypoTh #DM2  #reported CHF  #class 3 obesity - affects all aspects of care  - unclear medical history, patient and  both are unsure of her exact diagnoses, has not had medical follow up in years  - patient has no prior history of smoking or environmental exposures  - appears to have chronic CO2 retention likely iso BREEZY and OHS, unclear if there is also a component of asthma/COPD  - obtain CTA chest to r/o PE and eval bilateral opacities/R hilar enlargement  - CAP coverage with CTX/azithro for now, duonebs, prednisone (s/p methylpred 125 today)  - f/u RVP, procal, urine strep/legionella  - TTE to eval cardiac function, trop and proBNP are normal though BNP likely underestimated iso obesity  - Pulm consulted  - BiPAP on standby, will need nocturnal BiPAP  - monitor FSG needs on prednisone, start basal/bolus insulin pending trends  - Endo consult for evaluation of hypothyroidism given hypothermia, hypoventilation (chronic), obesity   - TSH >10 but may be low in the setting of sepsis, f/u FT4

## 2025-05-09 NOTE — H&P ADULT - PROBLEM SELECTOR PLAN 1
Reports hx of asthma, no COPD, no smoking history  Also likely BREEZY exacerbation resulting in significant CO2 retention on admission    - duonebs Q4H  - prednisone 40mg QD  - Reports hx of asthma, no noted COPD, no smoking history. Uses albuterol nebulizers at home  Also likely BREEZY exacerbation resulting in significant CO2 retention on admission without concomitant acidemia, does not use BIPAP at home  s/p solumedrol 125mg in ED and stacked duonebs    - duonebs Q4H  - prednisone 40mg QD  - empirically treat for CAP given infiltrates seen on CXR and hypothermia: CTX 1g (5/9 - ) and azithromycin 500mg QD (5/9 - )  - f/u bcx  - CT angio evaluate hilar mass (more likely rotation) and noted infiltrates on CXR  - BIPAP at night  - pulm consult, appreciate recs Reports hx of asthma, no noted COPD, no smoking history. Uses albuterol nebulizers at home  Also likely BREEZY exacerbation resulting in significant CO2 retention on admission without concomitant acidemia, does not use BIPAP at home  s/p solumedrol 125mg in ED and stacked duonebs    - duonebs Q4H  - prednisone 40mg QD  - empirically treat for CAP given infiltrates seen on CXR and hypothermia: CTX 1g (5/9 - ) and azithromycin 500mg QD (5/9 - )  - f/u bcx  - CT angio evaluate hilar mass (more likely rotation) and noted infiltrates on CXR, r/o PE  - BIPAP at night  - pulm consult, appreciate recs

## 2025-05-09 NOTE — H&P ADULT - PROBLEM SELECTOR PLAN 5
Home meds: lasix 40mg QD    - f/u TTE Reports history, unclear if systolic or diastolic dysfunction  BNP negative on admission, but patient is obese and unclear if diastolic/systolic. CXR notable for infiltrates and 1+ peripheral edema on exam  Home meds: lasix 40mg QD    - f/u TTE   - continue home lasix, may increase pending clinical course Home meds: atorvastatin 40mg QD    - continue home meds

## 2025-05-09 NOTE — H&P ADULT - PROBLEM SELECTOR PLAN 7
Home meds: pantoprazole 40mg QD    - continue home meds Home meds: Novolog, unsure dose    - MISS  - f/u A1c

## 2025-05-09 NOTE — CONSULT NOTE ADULT - ATTENDING COMMENTS
Patient likely with OHS exacerbation and agree with diuresis and will need BIPAP at night time. Labs and imaging reviewed and will need ABG to qualify for BIPAP. Do not favor that she has true COPD but will need PFT as outpatient for this. High risk of worsening.
Consultation was requested for Diabetes mellitus evaluation and hypothyroidism  .The patient was admitted with hypoxemic hypercapnea. History is positive for COPD and Asthma. She has cough with right sided chest pain. Hgb A1C is 9.0% with serum creatinine 0.71. She weighs 110 kg. Chest X-ray reveals bilateral infiltrates and right hilar prominence Hypothermia was noted on admission. .She has been at home on Novolog Insulin 70/30 12 units twice daily. She has lots of excess carbs in her diet, TSH in the past was 3.26 but now it is 10.69. She supposedly was taking 137 mcg Levothyroxine daily. Glucose levels here are 183-255. I agree with increase of levothyroxine to 150 mcg daily. I agree with treating her diabetes with 16 units Lantus and 8 units pre-meal Lispro Insulin .Sarcoidosis should be considered re pulmonary disease

## 2025-05-09 NOTE — H&P ADULT - NSICDXPASTMEDICALHX_GEN_ALL_CORE_FT
PAST MEDICAL HISTORY:  Asthma     Chronic systolic congestive heart failure     Diabetes type 2     HTN (hypertension)     Hyperlipidemia

## 2025-05-09 NOTE — H&P ADULT - NSHPPHYSICALEXAM_GEN_ALL_CORE
VITAL SIGNS:  Vital Signs Last 24 Hrs  T(C): 34.7 (09 May 2025 04:01), Max: 34.7 (09 May 2025 04:01)  T(F): 94.4 (09 May 2025 04:01), Max: 94.4 (09 May 2025 04:01)  HR: 65 (09 May 2025 05:49) (65 - 69)  BP: 127/74 (09 May 2025 05:49) (127/74 - 147/87)  BP(mean): --  RR: 18 (09 May 2025 05:49) (18 - 18)  SpO2: 95% (09 May 2025 05:49) (94% - 99%)    Parameters below as of 09 May 2025 05:49  Patient On (Oxygen Delivery Method): nasal cannula  O2 Flow (L/min): 2      GEN: Resting comfortably in NAD  ENMT: Moist oral mucosa; no conjunctival injection  RESP: No respiratory distress, no use of accessory muscles; CTA b/l, no WRR  CV: RRR, +S1S2, no MRG; no JVD; no peripheral edema  2+ radial, DP, PT   GI: Soft, NT, ND, no rebound, no guarding  MSK: No digital clubbing or cyanosis  SKIN: No rashes or ulcers noted  NEURO: Answers questions appropriately, moving all extremities spontaneously VITAL SIGNS:  Vital Signs Last 24 Hrs  T(C): 34.7 (09 May 2025 04:01), Max: 34.7 (09 May 2025 04:01)  T(F): 94.4 (09 May 2025 04:01), Max: 94.4 (09 May 2025 04:01)  HR: 65 (09 May 2025 05:49) (65 - 69)  BP: 127/74 (09 May 2025 05:49) (127/74 - 147/87)  BP(mean): --  RR: 18 (09 May 2025 05:49) (18 - 18)  SpO2: 95% (09 May 2025 05:49) (94% - 99%)    Parameters below as of 09 May 2025 05:49  Patient On (Oxygen Delivery Method): nasal cannula  O2 Flow (L/min): 2    GEN: Resting comfortably in NAD  ENMT: Moist oral mucosa; no conjunctival injection  RESP: No respiratory distress, no use of accessory muscles; poor inspiratory effort w scattered wheezes  CV: RRR, +S1S2, no MRG; no JVD; no peripheral edema  2+ radial, DP, PT   GI: Soft, NT, ND, no rebound, no guarding  MSK: No digital clubbing or cyanosis  SKIN: No rashes or ulcers noted  NEURO: Answers questions appropriately, moving all extremities spontaneously VITAL SIGNS:  Vital Signs Last 24 Hrs  T(C): 34.7 (09 May 2025 04:01), Max: 34.7 (09 May 2025 04:01)  T(F): 94.4 (09 May 2025 04:01), Max: 94.4 (09 May 2025 04:01)  HR: 65 (09 May 2025 05:49) (65 - 69)  BP: 127/74 (09 May 2025 05:49) (127/74 - 147/87)  BP(mean): --  RR: 18 (09 May 2025 05:49) (18 - 18)  SpO2: 95% (09 May 2025 05:49) (94% - 99%)    Parameters below as of 09 May 2025 05:49  Patient On (Oxygen Delivery Method): nasal cannula  O2 Flow (L/min): 2    GEN: Resting comfortably in NAD  ENMT: Moist oral mucosa; no conjunctival injection  RESP: No respiratory distress, no use of accessory muscles; poor inspiratory effort w scattered wheezes  CV: RRR, +S1S2, no MRG; no JVD; 1+ peripheral edema  2+ radial, DP, PT   GI: Soft, NT, ND, no rebound, no guarding  MSK: No digital clubbing or cyanosis  SKIN: No rashes or ulcers noted  NEURO: Answers questions appropriately, moving all extremities spontaneously

## 2025-05-09 NOTE — ED PROVIDER NOTE - ATTENDING APP SHARED VISIT CONTRIBUTION OF CARE
History of multiple medical problems including COPD, CHF, hypertension presented to the emergency room complaining of shortness of breath.  Patient has a cushingoid appearance, hypothermic on arrival to the ED.  Patient's CO2 was elevated on VBG, requiring BiPAP.  Unable to wean off BiPAP, requiring admission to telemetry.

## 2025-05-09 NOTE — ED PROVIDER NOTE - PHYSICAL EXAMINATION
Gen - Obese F, NAD, comfortable and non-toxic appearing, speaking in full sentences, phonating well   Skin - warm, dry, intact, no acute rash or lesions  HEENT - AT/NC, PERRL, pupils symmetric b/l, 3mm b/l, MMM, no nasal discharge, airway patent, neck supple and FROM  CV - S1S2, R/R/R  Resp - CTAB, no r/r/w  GI - soft, ND, NT, no CVAT b/l   MS - W/W/P, 1+ BLE pitting edema, calve supple and NT, No acute or gross deformities noted to extremities. No midline spinal tenderness or step off on palpation  Neuro - AxOx3, no focal neuro deficits, no focal neuro deficits, ambulatory without gait disturbance Gen - Obese F, somnolent appearing, but arousable to verbal stimuli   Skin - warm, dry, intact, no acute rash or lesions  HEENT - AT/NC, PERRL, pupils symmetric b/l, 3mm b/l, MMM, no nasal discharge, airway patent, neck supple and FROM  CV - S1S2, R/R/R  Resp - diminished bs at the bases with mild crackles and wheezing, no rales, no tripoding   GI - soft, ND, NT, no CVAT b/l   MS - W/W/P, 1+ BLE pitting edema, calve supple and NT, No acute or gross deformities noted to extremities. No midline spinal tenderness or step off on palpation  Neuro - AxOx3, no focal neuro deficits, no focal neuro deficits, ambulatory without gait disturbance

## 2025-05-09 NOTE — ED ADULT NURSE REASSESSMENT NOTE - NS ED NURSE REASSESS COMMENT FT1
Unable to get an oral or axillary temp on pt. PT is refusing rectal temp and repeatedly saying no.  at bedside. SANDRO Mir made aware

## 2025-05-09 NOTE — H&P ADULT - PROBLEM SELECTOR PLAN 6
F: as needed  E: K<4 Mg<2  N: regular diet  A: as tolerated  GI ppx: none  DVT ppx: Lovenox 40mg subQ  Code: full  Dispo: [facility] Home meds: Novolog, unsure dose    - MISS  - f/u A1c Reports history, unclear if systolic or diastolic dysfunction  BNP negative on admission, but patient is obese and unclear if diastolic/systolic. CXR notable for infiltrates and 1+ peripheral edema on exam  Home meds: lasix 40mg QD    - f/u TTE   - continue home lasix, may increase pending clinical course

## 2025-05-09 NOTE — ED ADULT NURSE NOTE - OBJECTIVE STATEMENT
Pt came in c/o sob x 1 week and intermittent cp/cough x 2 weeks. Hx HTN and DM. Pt A&Ox4 speaking in full clear sentences. Respirations even and unlabored.

## 2025-05-10 DIAGNOSIS — J96.21 ACUTE AND CHRONIC RESPIRATORY FAILURE WITH HYPOXIA: ICD-10-CM

## 2025-05-10 DIAGNOSIS — J96.02 ACUTE RESPIRATORY FAILURE WITH HYPERCAPNIA: ICD-10-CM

## 2025-05-10 DIAGNOSIS — K21.9 GASTRO-ESOPHAGEAL REFLUX DISEASE WITHOUT ESOPHAGITIS: ICD-10-CM

## 2025-05-10 DIAGNOSIS — E66.2 MORBID (SEVERE) OBESITY WITH ALVEOLAR HYPOVENTILATION: ICD-10-CM

## 2025-05-10 DIAGNOSIS — E66.01 MORBID (SEVERE) OBESITY DUE TO EXCESS CALORIES: ICD-10-CM

## 2025-05-10 DIAGNOSIS — D50.9 IRON DEFICIENCY ANEMIA, UNSPECIFIED: ICD-10-CM

## 2025-05-10 LAB
A1C WITH ESTIMATED AVERAGE GLUCOSE RESULT: 8.9 % — HIGH (ref 4–5.6)
ANION GAP SERPL CALC-SCNC: 9 MMOL/L — SIGNIFICANT CHANGE UP (ref 5–17)
BASOPHILS # BLD AUTO: 0 K/UL — SIGNIFICANT CHANGE UP (ref 0–0.2)
BASOPHILS NFR BLD AUTO: 0 % — SIGNIFICANT CHANGE UP (ref 0–2)
BLD GP AB SCN SERPL QL: NEGATIVE — SIGNIFICANT CHANGE UP
BLD GP AB SCN SERPL QL: NEGATIVE — SIGNIFICANT CHANGE UP
BUN SERPL-MCNC: 30 MG/DL — HIGH (ref 7–23)
CALCIUM SERPL-MCNC: 9.8 MG/DL — SIGNIFICANT CHANGE UP (ref 8.4–10.5)
CHLORIDE SERPL-SCNC: 98 MMOL/L — SIGNIFICANT CHANGE UP (ref 96–108)
CO2 SERPL-SCNC: 35 MMOL/L — HIGH (ref 22–31)
CREAT SERPL-MCNC: 0.77 MG/DL — SIGNIFICANT CHANGE UP (ref 0.5–1.3)
EGFR: 76 ML/MIN/1.73M2 — SIGNIFICANT CHANGE UP
EGFR: 76 ML/MIN/1.73M2 — SIGNIFICANT CHANGE UP
EOSINOPHIL # BLD AUTO: 0 K/UL — SIGNIFICANT CHANGE UP (ref 0–0.5)
EOSINOPHIL NFR BLD AUTO: 0 % — SIGNIFICANT CHANGE UP (ref 0–6)
ESTIMATED AVERAGE GLUCOSE: 209 MG/DL — HIGH (ref 68–114)
FERRITIN SERPL-MCNC: 16 NG/ML — SIGNIFICANT CHANGE UP (ref 13–330)
GLUCOSE SERPL-MCNC: 150 MG/DL — HIGH (ref 70–99)
HCT VFR BLD CALC: 30.8 % — LOW (ref 34.5–45)
HGB BLD-MCNC: 9.6 G/DL — LOW (ref 11.5–15.5)
IMM GRANULOCYTES NFR BLD AUTO: 0.4 % — SIGNIFICANT CHANGE UP (ref 0–0.9)
IRON SATN MFR SERPL: 25 UG/DL — LOW (ref 30–160)
IRON SATN MFR SERPL: 7 % — LOW (ref 14–50)
LYMPHOCYTES # BLD AUTO: 0.99 K/UL — LOW (ref 1–3.3)
LYMPHOCYTES # BLD AUTO: 11.8 % — LOW (ref 13–44)
MAGNESIUM SERPL-MCNC: 1.9 MG/DL — SIGNIFICANT CHANGE UP (ref 1.6–2.6)
MCHC RBC-ENTMCNC: 27.2 PG — SIGNIFICANT CHANGE UP (ref 27–34)
MCHC RBC-ENTMCNC: 31.2 G/DL — LOW (ref 32–36)
MCV RBC AUTO: 87.3 FL — SIGNIFICANT CHANGE UP (ref 80–100)
MONOCYTES # BLD AUTO: 0.45 K/UL — SIGNIFICANT CHANGE UP (ref 0–0.9)
MONOCYTES NFR BLD AUTO: 5.4 % — SIGNIFICANT CHANGE UP (ref 2–14)
NEUTROPHILS # BLD AUTO: 6.9 K/UL — SIGNIFICANT CHANGE UP (ref 1.8–7.4)
NEUTROPHILS NFR BLD AUTO: 82.4 % — HIGH (ref 43–77)
NRBC BLD AUTO-RTO: 0 /100 WBCS — SIGNIFICANT CHANGE UP (ref 0–0)
PHOSPHATE SERPL-MCNC: 3.4 MG/DL — SIGNIFICANT CHANGE UP (ref 2.5–4.5)
PLATELET # BLD AUTO: 261 K/UL — SIGNIFICANT CHANGE UP (ref 150–400)
POTASSIUM SERPL-MCNC: 4.4 MMOL/L — SIGNIFICANT CHANGE UP (ref 3.5–5.3)
POTASSIUM SERPL-SCNC: 4.4 MMOL/L — SIGNIFICANT CHANGE UP (ref 3.5–5.3)
RBC # BLD: 3.53 M/UL — LOW (ref 3.8–5.2)
RBC # BLD: 3.53 M/UL — LOW (ref 3.8–5.2)
RBC # FLD: 17.4 % — HIGH (ref 10.3–14.5)
RETICS #: 57.2 K/UL — SIGNIFICANT CHANGE UP (ref 25–125)
RETICS/RBC NFR: 1.6 % — SIGNIFICANT CHANGE UP (ref 0.5–2.5)
RH IG SCN BLD-IMP: POSITIVE — SIGNIFICANT CHANGE UP
RH IG SCN BLD-IMP: POSITIVE — SIGNIFICANT CHANGE UP
SODIUM SERPL-SCNC: 142 MMOL/L — SIGNIFICANT CHANGE UP (ref 135–145)
TIBC SERPL-MCNC: 339 UG/DL — SIGNIFICANT CHANGE UP (ref 220–430)
UIBC SERPL-MCNC: 314 UG/DL — SIGNIFICANT CHANGE UP (ref 110–370)
WBC # BLD: 8.37 K/UL — SIGNIFICANT CHANGE UP (ref 3.8–10.5)
WBC # FLD AUTO: 8.37 K/UL — SIGNIFICANT CHANGE UP (ref 3.8–10.5)

## 2025-05-10 RX ORDER — MAGNESIUM SULFATE 500 MG/ML
1 SYRINGE (ML) INJECTION ONCE
Refills: 0 | Status: COMPLETED | OUTPATIENT
Start: 2025-05-10 | End: 2025-05-10

## 2025-05-10 RX ORDER — IRON SUCROSE 20 MG/ML
500 INJECTION, SOLUTION INTRAVENOUS EVERY 24 HOURS
Refills: 0 | Status: COMPLETED | OUTPATIENT
Start: 2025-05-10 | End: 2025-05-11

## 2025-05-10 RX ORDER — HYDROXYZINE HYDROCHLORIDE 25 MG/1
25 TABLET, FILM COATED ORAL ONCE
Refills: 0 | Status: COMPLETED | OUTPATIENT
Start: 2025-05-10 | End: 2025-05-10

## 2025-05-10 RX ADMIN — IPRATROPIUM BROMIDE AND ALBUTEROL SULFATE 3 MILLILITER(S): .5; 2.5 SOLUTION RESPIRATORY (INHALATION) at 16:06

## 2025-05-10 RX ADMIN — INSULIN LISPRO 8 UNIT(S): 100 INJECTION, SOLUTION INTRAVENOUS; SUBCUTANEOUS at 11:47

## 2025-05-10 RX ADMIN — Medication 40 MILLIGRAM(S): at 06:17

## 2025-05-10 RX ADMIN — HYDROXYZINE HYDROCHLORIDE 25 MILLIGRAM(S): 25 TABLET, FILM COATED ORAL at 13:49

## 2025-05-10 RX ADMIN — IRON SUCROSE 68.75 MILLIGRAM(S): 20 INJECTION, SOLUTION INTRAVENOUS at 17:29

## 2025-05-10 RX ADMIN — INSULIN LISPRO 2: 100 INJECTION, SOLUTION INTRAVENOUS; SUBCUTANEOUS at 16:23

## 2025-05-10 RX ADMIN — Medication 150 MICROGRAM(S): at 06:10

## 2025-05-10 RX ADMIN — IPRATROPIUM BROMIDE AND ALBUTEROL SULFATE 3 MILLILITER(S): .5; 2.5 SOLUTION RESPIRATORY (INHALATION) at 23:44

## 2025-05-10 RX ADMIN — INSULIN LISPRO 8 UNIT(S): 100 INJECTION, SOLUTION INTRAVENOUS; SUBCUTANEOUS at 07:07

## 2025-05-10 RX ADMIN — IPRATROPIUM BROMIDE AND ALBUTEROL SULFATE 3 MILLILITER(S): .5; 2.5 SOLUTION RESPIRATORY (INHALATION) at 06:09

## 2025-05-10 RX ADMIN — INSULIN GLARGINE-YFGN 16 UNIT(S): 100 INJECTION, SOLUTION SUBCUTANEOUS at 22:56

## 2025-05-10 RX ADMIN — Medication 100 GRAM(S): at 12:43

## 2025-05-10 RX ADMIN — FUROSEMIDE 40 MILLIGRAM(S): 10 INJECTION INTRAMUSCULAR; INTRAVENOUS at 07:49

## 2025-05-10 RX ADMIN — IPRATROPIUM BROMIDE AND ALBUTEROL SULFATE 3 MILLILITER(S): .5; 2.5 SOLUTION RESPIRATORY (INHALATION) at 12:41

## 2025-05-10 RX ADMIN — ATORVASTATIN CALCIUM 40 MILLIGRAM(S): 80 TABLET, FILM COATED ORAL at 22:54

## 2025-05-10 RX ADMIN — IPRATROPIUM BROMIDE AND ALBUTEROL SULFATE 3 MILLILITER(S): .5; 2.5 SOLUTION RESPIRATORY (INHALATION) at 19:32

## 2025-05-10 RX ADMIN — INSULIN LISPRO 6: 100 INJECTION, SOLUTION INTRAVENOUS; SUBCUTANEOUS at 22:54

## 2025-05-10 RX ADMIN — ENOXAPARIN SODIUM 40 MILLIGRAM(S): 100 INJECTION SUBCUTANEOUS at 06:09

## 2025-05-10 RX ADMIN — IPRATROPIUM BROMIDE AND ALBUTEROL SULFATE 3 MILLILITER(S): .5; 2.5 SOLUTION RESPIRATORY (INHALATION) at 02:20

## 2025-05-10 RX ADMIN — PREDNISONE 40 MILLIGRAM(S): 20 TABLET ORAL at 06:10

## 2025-05-10 RX ADMIN — ENOXAPARIN SODIUM 40 MILLIGRAM(S): 100 INJECTION SUBCUTANEOUS at 17:30

## 2025-05-10 RX ADMIN — INSULIN LISPRO 8 UNIT(S): 100 INJECTION, SOLUTION INTRAVENOUS; SUBCUTANEOUS at 16:23

## 2025-05-10 NOTE — PROGRESS NOTE ADULT - SUBJECTIVE AND OBJECTIVE BOX
Female Patient is a 84y old  Female who presents with a chief complaint of acute on chronic hypoxemic hypercapnic respiratory failure (10 May 2025 07:57)    OVERNIGHT EVENTS: BiPAP o/n    SUBJECTIVE: overall feels that she is breathing well with minimal chest tightness. denies fevers, nausea, vomiting, pain, diarrhea, headache, weakness.     ROS: otherwise negative      T(C): 36.6 (05-10-25 @ 14:09), Max: 36.7 (05-10-25 @ 09:51)  HR: 89 (05-10-25 @ 11:45) (64 - 95)  BP: 118/58 (05-10-25 @ 11:45) (103/51 - 118/58)  RR: 20 (05-10-25 @ 11:45) (12 - 23)  SpO2: 95% (05-10-25 @ 11:45) (92% - 100%)  Wt(kg): --Vital Signs Last 24 Hrs  T(C): 36.6 (10 May 2025 14:09), Max: 36.7 (10 May 2025 09:51)  T(F): 97.8 (10 May 2025 14:09), Max: 98 (10 May 2025 09:51)  HR: 89 (10 May 2025 11:45) (64 - 95)  BP: 118/58 (10 May 2025 11:45) (103/51 - 118/58)  BP(mean): 84 (10 May 2025 11:45) (67 - 84)  RR: 20 (10 May 2025 11:45) (12 - 23)  SpO2: 95% (10 May 2025 11:45) (92% - 100%)    Parameters below as of 10 May 2025 11:45  Patient On (Oxygen Delivery Method): room air        PHYSICAL EXAM:  GEN: Resting comfortably in NAD  ENMT: Moist oral mucosa; no conjunctival injection  RESP: No respiratory distress, no use of accessory muscles; reduced inspiratory effort, CTAB without rales, rhonchi, wheezing  CV: RRR, +S1S2, no MRG; no JVD; 1+ peripheral edema  2+ radial, DP, PT   GI: Soft, NT, ND, no rebound, no guarding  MSK: No digital clubbing or cyanosis  SKIN: No rashes or ulcers noted  NEURO: Answers questions appropriately, moving all extremities spontaneouslyappropriate    LABS:                        9.6    8.37  )-----------( 261      ( 10 May 2025 06:38 )             30.8     05-10    142  |  98  |  30[H]  ----------------------------<  150[H]  4.4   |  35[H]  |  0.77    Ca    9.8      10 May 2025 06:38  Phos  3.4     05-10  Mg     1.9     05-10    TPro  7.2  /  Alb  3.0[L]  /  TBili  0.5  /  DBili  x   /  AST  34  /  ALT  24  /  AlkPhos  154[H]  05-09    Iron 25, TIBC 339, %Sat 7, Ferritin 16/ 05-10-25 @ 06:38      Urinalysis Basic - ( 10 May 2025 06:38 )    Color: x / Appearance: x / SG: x / pH: x  Gluc: 150 mg/dL / Ketone: x  / Bili: x / Urobili: x   Blood: x / Protein: x / Nitrite: x   Leuk Esterase: x / RBC: x / WBC x   Sq Epi: x / Non Sq Epi: x / Bacteria: x      CAPILLARY BLOOD GLUCOSE      POCT Blood Glucose.: 124 mg/dL (10 May 2025 11:39)  POCT Blood Glucose.: 137 mg/dL (10 May 2025 06:57)  POCT Blood Glucose.: 198 mg/dL (09 May 2025 21:52)  POCT Blood Glucose.: 288 mg/dL (09 May 2025 16:16)  POCT Blood Glucose.: 277 mg/dL (09 May 2025 15:18)      ABG - ( 09 May 2025 04:42 )  pH, Arterial: 7.42  pH, Blood: x     /  pCO2: 61    /  pO2: 93    / HCO3: x     / Base Excess: x     /  SaO2: 95.6              Urinalysis Basic - ( 10 May 2025 06:38 )    Color: x / Appearance: x / SG: x / pH: x  Gluc: 150 mg/dL / Ketone: x  / Bili: x / Urobili: x   Blood: x / Protein: x / Nitrite: x   Leuk Esterase: x / RBC: x / WBC x   Sq Epi: x / Non Sq Epi: x / Bacteria: x        MEDICATIONS  (STANDING):  albuterol/ipratropium for Nebulization 3 milliLiter(s) Nebulizer every 4 hours  atorvastatin 40 milliGRAM(s) Oral at bedtime  dextrose 5%. 1000 milliLiter(s) (50 mL/Hr) IV Continuous <Continuous>  dextrose 5%. 1000 milliLiter(s) (100 mL/Hr) IV Continuous <Continuous>  dextrose 50% Injectable 25 Gram(s) IV Push once  dextrose 50% Injectable 12.5 Gram(s) IV Push once  dextrose 50% Injectable 25 Gram(s) IV Push once  enoxaparin Injectable 40 milliGRAM(s) SubCutaneous every 12 hours  furosemide    Tablet 40 milliGRAM(s) Oral every 24 hours  glucagon  Injectable 1 milliGRAM(s) IntraMuscular once  insulin glargine Injectable (LANTUS) 16 Unit(s) SubCutaneous at bedtime  insulin lispro (ADMELOG) corrective regimen sliding scale   SubCutaneous Before meals and at bedtime  insulin lispro Injectable (ADMELOG) 8 Unit(s) SubCutaneous three times a day before meals  levothyroxine 150 MICROGram(s) Oral daily  lisinopril 10 milliGRAM(s) Oral daily  pantoprazole    Tablet 40 milliGRAM(s) Oral before breakfast  predniSONE   Tablet 40 milliGRAM(s) Oral daily    MEDICATIONS  (PRN):  acetaminophen     Tablet .. 650 milliGRAM(s) Oral every 6 hours PRN Temp greater or equal to 38C (100.4F), Mild Pain (1 - 3)  dextrose Oral Gel 15 Gram(s) Oral once PRN Blood Glucose LESS THAN 70 milliGRAM(s)/deciliter  ondansetron Injectable 4 milliGRAM(s) IV Push every 8 hours PRN Nausea and/or Vomiting      RADIOLOGY & ADDITIONAL TESTS: Reviewed

## 2025-05-10 NOTE — PROGRESS NOTE ADULT - PROBLEM SELECTOR PLAN 7
Home meds: carvedilol 6.25mg BID, lisinopril 10mg QD  - c/w home lisinopril  - hold home coreg iso asthma exacerbation.

## 2025-05-10 NOTE — PROGRESS NOTE ADULT - PROBLEM SELECTOR PLAN 6
Presenting with Hgb 10.6 Total iron 25, Iron saturation 7%. Ferritin 16.  Per ganzoni equation with target Hgb 12, deficit of 1138 mg.   -IV iron sucrose 500 mg x 2 (5/10-5/11)  -replete orally for remainder of deficit

## 2025-05-10 NOTE — PROGRESS NOTE ADULT - PROBLEM SELECTOR PROBLEM 1
Acute on chronic respiratory failure with hypoxia and hypercapnia Acute respiratory failure with hypercapnia

## 2025-05-10 NOTE — PROGRESS NOTE ADULT - PROBLEM SELECTOR PLAN 10
Home meds: Novolog, unsure dose. Endocrinology consulted. A1c 9.0 on admission.   -f/u endo recs.   -16u lantus  -8u lispro mealtime  -mISS  -outpt endo f/u

## 2025-05-10 NOTE — PROGRESS NOTE ADULT - PROBLEM SELECTOR PLAN 4
BNP on admission, but CXR notable for infiltrates and 1+ peripheral edema on exam. 5/9 TTE showing grade II LV diastolic dysfunction, severely dilated LA, and EF ~60%. Reportedly takes lasix 40 mg qD  -c/w home lasix regimen, increase as needed if concerned for pulm edema vs more heart failure driven hypercapnia

## 2025-05-10 NOTE — PROGRESS NOTE ADULT - SUBJECTIVE AND OBJECTIVE BOX
SUBJECTIVE / INTERVAL HPI: Patient was seen and examined this morning.     Overnight events:    CAPILLARY BLOOD GLUCOSE & INSULIN RECEIVED  277 mg/dL (05-09 @ 15:18)  288 mg/dL (05-09 @ 16:16) 9 + 6  198 mg/dL (05-09 @ 21:52) 10 + 2  137 mg/dL (05-10 @ 06:57)      REVIEW OF SYSTEMS  Constitutional:  Negative fever, chills   Cardiovascular:  Negative for chest pain or palpitations.  Respiratory:  Negative for cough, wheezing, or shortness of breath.    Gastrointestinal:  Negative for nausea, vomiting, diarrhea, constipation, or abdominal pain.  Genitourinary:  Negative frequency, urgency or dysuria.  Neurologic:  No headache, confusion, dizziness, lightheadedness.    PHYSICAL EXAM  Vital Signs Last 24 Hrs  T(C): 36.3 (10 May 2025 05:19), Max: 36.6 (09 May 2025 19:11)  T(F): 97.4 (10 May 2025 05:19), Max: 97.9 (09 May 2025 19:11)  HR: 82 (10 May 2025 08:42) (64 - 95)  BP: 114/58 (10 May 2025 08:42) (103/51 - 128/58)  BP(mean): 80 (10 May 2025 08:42) (67 - 83)  RR: 17 (10 May 2025 08:42) (12 - 23)  SpO2: 95% (10 May 2025 08:42) (92% - 100%)    Parameters below as of 10 May 2025 08:42  Patient On (Oxygen Delivery Method): room air        Constitutional: Awake, alert, in no acute distress.   HEENT: Normocephalic, atraumatic, JASE.  Respiratory: Lungs clear to ausculation bilaterally.   Cardiovascular: regular rhythm, normal S1 and S2, no audible murmurs.   GI: soft, non-tender, non-distended, bowel sounds present.  Extremities: No lower extremity edema.     LABS  CBC - WBC/HGB/HTC/PLT: 8.37/9.6/30.8/261 (05-10-25)  BMP - Na/K/Cl/Bicarb/BUN/Cr/Gluc/AG/eGFR: 142/4.4/98/35/30/0.77/150/9/76 (05-10-25)  Ca - 9.8 (05-10-25)  Phos - 3.4 (05-10-25)  Mg - 1.9 (05-10-25)  LFT - Alb/Tprot/Tbili/Dbili/AlkPhos/ALT/AST: 3.0/--/0.5/--/154/24/34 (05-09-25)    Thyroid Stimulating Hormone, Serum: 4.240 (05-09-25)  Total T4/Free T4: --/1.390 (05-09-25)      05-09-25 @ 07:01  -  05-10-25 @ 07:00  --------------------------------------------------------  IN: 0 mL / OUT: 400 mL / NET: -400 mL    05-10-25 @ 07:01  -  05-10-25 @ 09:42  --------------------------------------------------------  IN: 200 mL / OUT: 850 mL / NET: -650 mL        MEDICATIONS  MEDICATIONS  (STANDING):  albuterol/ipratropium for Nebulization 3 milliLiter(s) Nebulizer every 4 hours  atorvastatin 40 milliGRAM(s) Oral at bedtime  dextrose 5%. 1000 milliLiter(s) (50 mL/Hr) IV Continuous <Continuous>  dextrose 5%. 1000 milliLiter(s) (100 mL/Hr) IV Continuous <Continuous>  dextrose 50% Injectable 25 Gram(s) IV Push once  dextrose 50% Injectable 12.5 Gram(s) IV Push once  dextrose 50% Injectable 25 Gram(s) IV Push once  enoxaparin Injectable 40 milliGRAM(s) SubCutaneous every 12 hours  furosemide    Tablet 40 milliGRAM(s) Oral every 24 hours  glucagon  Injectable 1 milliGRAM(s) IntraMuscular once  insulin glargine Injectable (LANTUS) 16 Unit(s) SubCutaneous at bedtime  insulin lispro (ADMELOG) corrective regimen sliding scale   SubCutaneous Before meals and at bedtime  insulin lispro Injectable (ADMELOG) 8 Unit(s) SubCutaneous three times a day before meals  levothyroxine 150 MICROGram(s) Oral daily  lisinopril 10 milliGRAM(s) Oral daily  pantoprazole    Tablet 40 milliGRAM(s) Oral before breakfast  predniSONE   Tablet 40 milliGRAM(s) Oral daily    MEDICATIONS  (PRN):  acetaminophen     Tablet .. 650 milliGRAM(s) Oral every 6 hours PRN Temp greater or equal to 38C (100.4F), Mild Pain (1 - 3)  dextrose Oral Gel 15 Gram(s) Oral once PRN Blood Glucose LESS THAN 70 milliGRAM(s)/deciliter  ondansetron Injectable 4 milliGRAM(s) IV Push every 8 hours PRN Nausea and/or Vomiting    ASSESSMENT / RECOMMENDATIONS    UDAY PARIS is a 85 yo F with PMHx of CHF. on oral diuretics, HTN, T2DM, hypothyroidism, CAD, COPD/asthma, no h/o intubation and not on any home O2/CPAP, BIBA accompanied by  for worsening SOB/cough x 1 wk. Hypercapnic on admission, no off Bipap and on NC. Mental status improving. Endocrinology consulted for diabetes management and hypothyroidism. She is currently on prednisone 40mg qd as well.       A1C: 8.9 %  BUN: 30  Creatinine: 0.77  GFR: 76  Weight: 110.7  BMI: 47.7    # Type 2 diabetes mellitus with hyperglycemia  - Please keep lantus 16  units at bedtime.   - keep lispro 8  units before each meal.  - Continue lispro moderate dose sliding scale before meals and at bedtime.  - Patient's fingerstick glucose goal is 100-180 mg/dL.    - Discharge recommendations to be discussed.   - Patient can follow up at discharge with [facility] by calling [phone] to make an appointment.      # Hypothyroidism  - TSH 10.669. Repeat TSH 4.24, Free T4 1.39 (TSH suppressed after steroid)   - c/w PO levothyroxine 150 mcg daily  - - Advised to take in the morning on an empty stomach at least 30 minutes before other medications and food.     Case discussed with Dr. Stephens. Primary team updated.       Jeaneth Mendoza  Endocrinology Fellow    Service Pager: [phone]    SUBJECTIVE / INTERVAL HPI: Patient was seen and examined this morning.     Overnight events:  pt was sleeping, only answer a few wrods  pt reports good appetite  denies abd pain, nausea, vomiting      CAPILLARY BLOOD GLUCOSE & INSULIN RECEIVED  277 mg/dL (05-09 @ 15:18)  288 mg/dL (05-09 @ 16:16) 9 + 6  198 mg/dL (05-09 @ 21:52) 16 + 2  137 mg/dL (05-10 @ 06:57)8  124    REVIEW OF SYSTEMS  Constitutional:  Negative fever, chills   Cardiovascular:  Negative for chest pain or palpitations.  Respiratory:  Negative for cough, wheezing, or shortness of breath.    Gastrointestinal:  Negative for nausea, vomiting, diarrhea, constipation, or abdominal pain.  Genitourinary:  Negative frequency, urgency or dysuria.  Neurologic:  No headache, confusion, dizziness, lightheadedness.    PHYSICAL EXAM  Vital Signs Last 24 Hrs  T(C): 36.3 (10 May 2025 05:19), Max: 36.6 (09 May 2025 19:11)  T(F): 97.4 (10 May 2025 05:19), Max: 97.9 (09 May 2025 19:11)  HR: 82 (10 May 2025 08:42) (64 - 95)  BP: 114/58 (10 May 2025 08:42) (103/51 - 128/58)  BP(mean): 80 (10 May 2025 08:42) (67 - 83)  RR: 17 (10 May 2025 08:42) (12 - 23)  SpO2: 95% (10 May 2025 08:42) (92% - 100%)    Parameters below as of 10 May 2025 08:42  Patient On (Oxygen Delivery Method): room air        Constitutional: Awake, alert, in no acute distress.   HEENT: Normocephalic, atraumatic, JASE.  Respiratory: Lungs clear to ausculation bilaterally.   Cardiovascular: regular rhythm, normal S1 and S2, no audible murmurs.   GI: soft, non-tender, non-distended, bowel sounds present.  Extremities: No lower extremity edema.     LABS  CBC - WBC/HGB/HTC/PLT: 8.37/9.6/30.8/261 (05-10-25)  BMP - Na/K/Cl/Bicarb/BUN/Cr/Gluc/AG/eGFR: 142/4.4/98/35/30/0.77/150/9/76 (05-10-25)  Ca - 9.8 (05-10-25)  Phos - 3.4 (05-10-25)  Mg - 1.9 (05-10-25)  LFT - Alb/Tprot/Tbili/Dbili/AlkPhos/ALT/AST: 3.0/--/0.5/--/154/24/34 (05-09-25)    Thyroid Stimulating Hormone, Serum: 4.240 (05-09-25)  Total T4/Free T4: --/1.390 (05-09-25)      05-09-25 @ 07:01  -  05-10-25 @ 07:00  --------------------------------------------------------  IN: 0 mL / OUT: 400 mL / NET: -400 mL    05-10-25 @ 07:01  -  05-10-25 @ 09:42  --------------------------------------------------------  IN: 200 mL / OUT: 850 mL / NET: -650 mL        MEDICATIONS  MEDICATIONS  (STANDING):  albuterol/ipratropium for Nebulization 3 milliLiter(s) Nebulizer every 4 hours  atorvastatin 40 milliGRAM(s) Oral at bedtime  dextrose 5%. 1000 milliLiter(s) (50 mL/Hr) IV Continuous <Continuous>  dextrose 5%. 1000 milliLiter(s) (100 mL/Hr) IV Continuous <Continuous>  dextrose 50% Injectable 25 Gram(s) IV Push once  dextrose 50% Injectable 12.5 Gram(s) IV Push once  dextrose 50% Injectable 25 Gram(s) IV Push once  enoxaparin Injectable 40 milliGRAM(s) SubCutaneous every 12 hours  furosemide    Tablet 40 milliGRAM(s) Oral every 24 hours  glucagon  Injectable 1 milliGRAM(s) IntraMuscular once  insulin glargine Injectable (LANTUS) 16 Unit(s) SubCutaneous at bedtime  insulin lispro (ADMELOG) corrective regimen sliding scale   SubCutaneous Before meals and at bedtime  insulin lispro Injectable (ADMELOG) 8 Unit(s) SubCutaneous three times a day before meals  levothyroxine 150 MICROGram(s) Oral daily  lisinopril 10 milliGRAM(s) Oral daily  pantoprazole    Tablet 40 milliGRAM(s) Oral before breakfast  predniSONE   Tablet 40 milliGRAM(s) Oral daily    MEDICATIONS  (PRN):  acetaminophen     Tablet .. 650 milliGRAM(s) Oral every 6 hours PRN Temp greater or equal to 38C (100.4F), Mild Pain (1 - 3)  dextrose Oral Gel 15 Gram(s) Oral once PRN Blood Glucose LESS THAN 70 milliGRAM(s)/deciliter  ondansetron Injectable 4 milliGRAM(s) IV Push every 8 hours PRN Nausea and/or Vomiting    ASSESSMENT / RECOMMENDATIONS    UDAY PARIS is a 83 yo F with PMHx of CHF. on oral diuretics, HTN, T2DM, hypothyroidism, CAD, COPD/asthma, no h/o intubation and not on any home O2/CPAP, BIBA accompanied by  for worsening SOB/cough x 1 wk. Hypercapnic on admission, no off Bipap and on NC. Mental status improving. Endocrinology consulted for diabetes management and hypothyroidism. She is currently on prednisone 40mg qd as well.       A1C: 8.9 %  BUN: 30  Creatinine: 0.77  GFR: 76  Weight: 110.7  BMI: 47.7    # Type 2 diabetes mellitus with hyperglycemia  - Please keep lantus 16  units at bedtime.   - keep lispro 8  units before each meal.  - Continue lispro moderate dose sliding scale before meals and at bedtime.  - Patient's fingerstick glucose goal is 100-180 mg/dL.    - Discharge recommendations to be discussed.   - Patient can follow up at discharge with [facility] by calling [phone] to make an appointment.      # Hypothyroidism  - TSH 10.669. Repeat TSH 4.24, Free T4 1.39 (TSH suppressed after steroid)   - c/w PO levothyroxine 150 mcg daily  - - Advised to take in the morning on an empty stomach at least 30 minutes before other medications and food.     Case discussed with Dr. Stephens. Primary team updated.       Jeaneth Mendoza  Endocrinology Fellow    Service Pager: [phone]

## 2025-05-10 NOTE — PROGRESS NOTE ADULT - PROBLEM SELECTOR PLAN 5
BMI 47.7. Consistent Carb Diet.   -promote diet + exercise on admission  -can set up with outpt dietician  -outpt hypoventilation as above

## 2025-05-10 NOTE — PROGRESS NOTE ADULT - PROBLEM SELECTOR PLAN 8
TSH elevated, presented hypothermic  May be iso infection however given elevated TSH will increase home dose  On home synthroid 137mcg   - increased synthroid to 150mcg.  - f/u endo recs

## 2025-05-10 NOTE — PROGRESS NOTE ADULT - PROBLEM SELECTOR PLAN 1
Patient presented with pCO2 61 on ABG, complaining of worsening dyspnea and productive cough as well as chest pain/tightness. Endorses R sided pleurisy with intermittent wheezing, uses nebulizer tx and albuterol pump frequently. Significant snoring per her . Patient presented with pCO2 61 on ABG, complaining of worsening dyspnea and productive cough as well as chest pain/tightness. Endorses R sided pleurisy with intermittent wheezing, uses nebulizer tx and albuterol pump frequently. Significant snoring per her . Infectious workup negative. Saturating well on room air. CTA PE negative. CXR showing bilateral opacities with R hilar enlargement. Evaluated by pulm, less concerned for pneumonia will monitor off abx and treat as asthma exacerbation vs obesity hypoventilation.   -standing duonebs q4h  -continue to diurese for HF as below, monitor bicarb  -BiPAP nightly and w/ naps, continue on discharge  -repeat ABG 5/11 AM to obtain BiPAP clearance  -outpt pulm f/u for PFTs + sleep study  -incentive spirometry  -OOBTC

## 2025-05-11 LAB
ANION GAP SERPL CALC-SCNC: 6 MMOL/L — SIGNIFICANT CHANGE UP (ref 5–17)
BASOPHILS # BLD AUTO: 0.02 K/UL — SIGNIFICANT CHANGE UP (ref 0–0.2)
BASOPHILS NFR BLD AUTO: 0.3 % — SIGNIFICANT CHANGE UP (ref 0–2)
BUN SERPL-MCNC: 32 MG/DL — HIGH (ref 7–23)
CALCIUM SERPL-MCNC: 9.1 MG/DL — SIGNIFICANT CHANGE UP (ref 8.4–10.5)
CHLORIDE SERPL-SCNC: 102 MMOL/L — SIGNIFICANT CHANGE UP (ref 96–108)
CO2 SERPL-SCNC: 36 MMOL/L — HIGH (ref 22–31)
CREAT SERPL-MCNC: 0.79 MG/DL — SIGNIFICANT CHANGE UP (ref 0.5–1.3)
EGFR: 74 ML/MIN/1.73M2 — SIGNIFICANT CHANGE UP
EGFR: 74 ML/MIN/1.73M2 — SIGNIFICANT CHANGE UP
EOSINOPHIL # BLD AUTO: 0.02 K/UL — SIGNIFICANT CHANGE UP (ref 0–0.5)
EOSINOPHIL NFR BLD AUTO: 0.3 % — SIGNIFICANT CHANGE UP (ref 0–6)
GLUCOSE SERPL-MCNC: 55 MG/DL — LOW (ref 70–99)
HCT VFR BLD CALC: 31.5 % — LOW (ref 34.5–45)
HGB BLD-MCNC: 9.7 G/DL — LOW (ref 11.5–15.5)
IMM GRANULOCYTES NFR BLD AUTO: 0.4 % — SIGNIFICANT CHANGE UP (ref 0–0.9)
LYMPHOCYTES # BLD AUTO: 1.09 K/UL — SIGNIFICANT CHANGE UP (ref 1–3.3)
LYMPHOCYTES # BLD AUTO: 15.2 % — SIGNIFICANT CHANGE UP (ref 13–44)
MAGNESIUM SERPL-MCNC: 2.1 MG/DL — SIGNIFICANT CHANGE UP (ref 1.6–2.6)
MCHC RBC-ENTMCNC: 27.7 PG — SIGNIFICANT CHANGE UP (ref 27–34)
MCHC RBC-ENTMCNC: 30.8 G/DL — LOW (ref 32–36)
MCV RBC AUTO: 90 FL — SIGNIFICANT CHANGE UP (ref 80–100)
MONOCYTES # BLD AUTO: 0.46 K/UL — SIGNIFICANT CHANGE UP (ref 0–0.9)
MONOCYTES NFR BLD AUTO: 6.4 % — SIGNIFICANT CHANGE UP (ref 2–14)
NEUTROPHILS # BLD AUTO: 5.54 K/UL — SIGNIFICANT CHANGE UP (ref 1.8–7.4)
NEUTROPHILS NFR BLD AUTO: 77.4 % — HIGH (ref 43–77)
NRBC BLD AUTO-RTO: 0 /100 WBCS — SIGNIFICANT CHANGE UP (ref 0–0)
PHOSPHATE SERPL-MCNC: 3 MG/DL — SIGNIFICANT CHANGE UP (ref 2.5–4.5)
PLATELET # BLD AUTO: 230 K/UL — SIGNIFICANT CHANGE UP (ref 150–400)
POTASSIUM SERPL-MCNC: 4.2 MMOL/L — SIGNIFICANT CHANGE UP (ref 3.5–5.3)
POTASSIUM SERPL-SCNC: 4.2 MMOL/L — SIGNIFICANT CHANGE UP (ref 3.5–5.3)
RBC # BLD: 3.5 M/UL — LOW (ref 3.8–5.2)
RBC # FLD: 17.4 % — HIGH (ref 10.3–14.5)
SODIUM SERPL-SCNC: 144 MMOL/L — SIGNIFICANT CHANGE UP (ref 135–145)
WBC # BLD: 7.16 K/UL — SIGNIFICANT CHANGE UP (ref 3.8–10.5)
WBC # FLD AUTO: 7.16 K/UL — SIGNIFICANT CHANGE UP (ref 3.8–10.5)

## 2025-05-11 RX ORDER — INSULIN LISPRO 100 U/ML
INJECTION, SOLUTION INTRAVENOUS; SUBCUTANEOUS AT BEDTIME
Refills: 0 | Status: DISCONTINUED | OUTPATIENT
Start: 2025-05-11 | End: 2025-05-13

## 2025-05-11 RX ORDER — INSULIN LISPRO 100 U/ML
INJECTION, SOLUTION INTRAVENOUS; SUBCUTANEOUS
Refills: 0 | Status: DISCONTINUED | OUTPATIENT
Start: 2025-05-11 | End: 2025-05-13

## 2025-05-11 RX ORDER — INSULIN GLARGINE-YFGN 100 [IU]/ML
12 INJECTION, SOLUTION SUBCUTANEOUS AT BEDTIME
Refills: 0 | Status: DISCONTINUED | OUTPATIENT
Start: 2025-05-11 | End: 2025-05-13

## 2025-05-11 RX ADMIN — Medication 40 MILLIGRAM(S): at 06:34

## 2025-05-11 RX ADMIN — IPRATROPIUM BROMIDE AND ALBUTEROL SULFATE 3 MILLILITER(S): .5; 2.5 SOLUTION RESPIRATORY (INHALATION) at 12:23

## 2025-05-11 RX ADMIN — IPRATROPIUM BROMIDE AND ALBUTEROL SULFATE 3 MILLILITER(S): .5; 2.5 SOLUTION RESPIRATORY (INHALATION) at 03:19

## 2025-05-11 RX ADMIN — ENOXAPARIN SODIUM 40 MILLIGRAM(S): 100 INJECTION SUBCUTANEOUS at 06:34

## 2025-05-11 RX ADMIN — FUROSEMIDE 40 MILLIGRAM(S): 10 INJECTION INTRAMUSCULAR; INTRAVENOUS at 07:05

## 2025-05-11 RX ADMIN — LISINOPRIL 10 MILLIGRAM(S): 5 TABLET ORAL at 06:33

## 2025-05-11 RX ADMIN — INSULIN GLARGINE-YFGN 12 UNIT(S): 100 INJECTION, SOLUTION SUBCUTANEOUS at 22:31

## 2025-05-11 RX ADMIN — INSULIN LISPRO 8 UNIT(S): 100 INJECTION, SOLUTION INTRAVENOUS; SUBCUTANEOUS at 17:59

## 2025-05-11 RX ADMIN — INSULIN LISPRO 6: 100 INJECTION, SOLUTION INTRAVENOUS; SUBCUTANEOUS at 13:43

## 2025-05-11 RX ADMIN — INSULIN LISPRO 8 UNIT(S): 100 INJECTION, SOLUTION INTRAVENOUS; SUBCUTANEOUS at 09:11

## 2025-05-11 RX ADMIN — ATORVASTATIN CALCIUM 40 MILLIGRAM(S): 80 TABLET, FILM COATED ORAL at 22:08

## 2025-05-11 RX ADMIN — INSULIN LISPRO 8 UNIT(S): 100 INJECTION, SOLUTION INTRAVENOUS; SUBCUTANEOUS at 13:42

## 2025-05-11 RX ADMIN — ENOXAPARIN SODIUM 40 MILLIGRAM(S): 100 INJECTION SUBCUTANEOUS at 17:59

## 2025-05-11 RX ADMIN — Medication 150 MICROGRAM(S): at 06:33

## 2025-05-11 RX ADMIN — IPRATROPIUM BROMIDE AND ALBUTEROL SULFATE 3 MILLILITER(S): .5; 2.5 SOLUTION RESPIRATORY (INHALATION) at 07:05

## 2025-05-11 RX ADMIN — IRON SUCROSE 68.75 MILLIGRAM(S): 20 INJECTION, SOLUTION INTRAVENOUS at 18:00

## 2025-05-11 RX ADMIN — IPRATROPIUM BROMIDE AND ALBUTEROL SULFATE 3 MILLILITER(S): .5; 2.5 SOLUTION RESPIRATORY (INHALATION) at 15:38

## 2025-05-11 RX ADMIN — PREDNISONE 40 MILLIGRAM(S): 20 TABLET ORAL at 06:34

## 2025-05-11 NOTE — PROGRESS NOTE ADULT - PROBLEM SELECTOR PLAN 10
Home meds: Novolog, unsure dose. Endocrinology consulted. A1c 9.0 on admission.   -f/u endo recs.   -16u lantus  -8u lispro mealtime  -mISS  -outpt endo f/u Home meds: Novolog, unsure dose. Endocrinology consulted. A1c 9.0 on admission.     -f/u endo recs.   -12u lantus nightly  -8u lispro mealtime  -mISS with meals and Sherri at bedtime  -outpt endo f/u

## 2025-05-11 NOTE — PROGRESS NOTE ADULT - PROBLEM SELECTOR PLAN 3
Per surescritahir, ventolin nebulizer used.  -as above Per surescritahir, ventolin nebulizer used.    -as above

## 2025-05-11 NOTE — PROGRESS NOTE ADULT - PROBLEM SELECTOR PLAN 9
Home meds: atorvastatin 40mg QD  - continue home meds. Home meds: atorvastatin 40mg QD    - continue home meds.

## 2025-05-11 NOTE — PROGRESS NOTE ADULT - PROBLEM SELECTOR PLAN 6
Presenting with Hgb 10.6 Total iron 25, Iron saturation 7%. Ferritin 16.  Per ganzoni equation with target Hgb 12, deficit of 1138 mg.   -IV iron sucrose 500 mg x 2 (5/10-5/11)  -replete orally for remainder of deficit Presenting with Hgb 10.6 Total iron 25, Iron saturation 7%. Ferritin 16.  Per ganzoni equation with target Hgb 12, deficit of 1138 mg.     -IV iron sucrose 500 mg x 2 (5/10-5/11)  -replete orally for remainder of deficit

## 2025-05-11 NOTE — PROGRESS NOTE ADULT - PROBLEM SELECTOR PLAN 1
Patient presented with pCO2 61 on ABG, complaining of worsening dyspnea and productive cough as well as chest pain/tightness. Endorses R sided pleurisy with intermittent wheezing, uses nebulizer tx and albuterol pump frequently. Significant snoring per her . Infectious workup negative. Saturating well on room air. CTA PE negative. CXR showing bilateral opacities with R hilar enlargement. Evaluated by pulm, less concerned for pneumonia will monitor off abx and treat as asthma exacerbation vs obesity hypoventilation.   -standing duonebs q4h  -continue to diurese for HF as below, monitor bicarb  -BiPAP nightly and w/ naps, continue on discharge  -repeat ABG 5/11 AM to obtain BiPAP clearance  -outpt pulm f/u for PFTs + sleep study  -incentive spirometry  -OOBTC Patient presented with pCO2 61 on ABG, complaining of worsening dyspnea and productive cough as well as chest pain/tightness. Endorses R sided pleurisy with intermittent wheezing, uses nebulizer tx and albuterol pump frequently. Significant snoring per her . Infectious workup negative. Saturating well on room air. CTA PE negative. CXR showing bilateral opacities with R hilar enlargement. Evaluated by pulm, less concerned for pneumonia will monitor off abx and treat as asthma exacerbation vs obesity hypoventilation.   -standing duonebs q4h  -continue to diurese for HF as below, monitor bicarb  -BiPAP nightly and w/ naps, continue on discharge  -outpt pulm f/u for PFTs + sleep study  -incentive spirometry  -OOBTC Patient presented with pCO2 61 on ABG, complaining of worsening dyspnea and productive cough as well as chest pain/tightness. Endorses R sided pleurisy with intermittent wheezing, uses nebulizer tx and albuterol pump frequently. Significant snoring per her . Infectious workup negative. Saturating well on room air. CTA PE negative. CXR showing bilateral opacities with R hilar enlargement. Evaluated by pulm, less concerned for pneumonia will monitor off abx and treat as asthma exacerbation vs obesity hypoventilation.     -standing duonebs q4h  -continue to diurese for HF as below, monitor bicarb  -BiPAP nightly and w/ naps, continue on discharge  -outpt pulm f/u for PFTs + sleep study  -incentive spirometry  -OOBTC

## 2025-05-11 NOTE — PROGRESS NOTE ADULT - SUBJECTIVE AND OBJECTIVE BOX
SUBJECTIVE / INTERVAL HPI: Patient was seen and examined this morning.     Overnight events:    CAPILLARY BLOOD GLUCOSE & INSULIN RECEIVED  198 mg/dL (05-09 @ 21:52)  137 mg/dL (05-10 @ 06:57)  124 mg/dL (05-10 @ 11:39)  192 mg/dL (05-10 @ 16:18) 8  254 mg/dL (05-10 @ 22:44) 16 + 6  glucose 55  102 mg/dL (05-11 @ 09:05)      REVIEW OF SYSTEMS  Constitutional:  Negative fever, chills   Cardiovascular:  Negative for chest pain or palpitations.  Respiratory:  Negative for cough, wheezing, or shortness of breath.    Gastrointestinal:  Negative for nausea, vomiting, diarrhea, constipation, or abdominal pain.  Genitourinary:  Negative frequency, urgency or dysuria.  Neurologic:  No headache, confusion, dizziness, lightheadedness.    PHYSICAL EXAM  Vital Signs Last 24 Hrs  T(C): 36.7 (11 May 2025 08:43), Max: 36.7 (11 May 2025 08:43)  T(F): 98.1 (11 May 2025 08:43), Max: 98.1 (11 May 2025 08:43)  HR: 71 (11 May 2025 08:43) (66 - 89)  BP: 121/70 (11 May 2025 08:43) (118/58 - 140/65)  BP(mean): 80 (10 May 2025 16:10) (80 - 84)  RR: 18 (11 May 2025 08:43) (18 - 20)  SpO2: 97% (11 May 2025 08:43) (94% - 100%)    Parameters below as of 11 May 2025 08:43  Patient On (Oxygen Delivery Method): nasal cannula  O2 Flow (L/min): 2      Constitutional: Awake, alert, in no acute distress.   HEENT: Normocephalic, atraumatic, JASE.  Respiratory: Lungs clear to ausculation bilaterally.   Cardiovascular: regular rhythm, normal S1 and S2, no audible murmurs.   GI: soft, non-tender, non-distended, bowel sounds present.  Extremities: No lower extremity edema.     LABS  CBC - WBC/HGB/HTC/PLT: 7.16/9.7/31.5/230 (05-11-25)  BMP - Na/K/Cl/Bicarb/BUN/Cr/Gluc/AG/eGFR: 144/4.2/102/36/32/0.79/55/6/74 (05-11-25)  Ca - 9.1 (05-11-25)  Phos - 3.0 (05-11-25)  Mg - 2.1 (05-11-25)  LFT - Alb/Tprot/Tbili/Dbili/AlkPhos/ALT/AST: 3.0/--/0.5/--/154/24/34 (05-09-25)    Thyroid Stimulating Hormone, Serum: 4.240 (05-09-25)  Total T4/Free T4: --/1.390 (05-09-25)      05-10-25 @ 07:01  -  05-11-25 @ 07:00  --------------------------------------------------------  IN: 200 mL / OUT: 1400 mL / NET: -1200 mL        MEDICATIONS  MEDICATIONS  (STANDING):  albuterol/ipratropium for Nebulization 3 milliLiter(s) Nebulizer every 4 hours  atorvastatin 40 milliGRAM(s) Oral at bedtime  dextrose 5%. 1000 milliLiter(s) (50 mL/Hr) IV Continuous <Continuous>  dextrose 5%. 1000 milliLiter(s) (100 mL/Hr) IV Continuous <Continuous>  dextrose 50% Injectable 25 Gram(s) IV Push once  dextrose 50% Injectable 12.5 Gram(s) IV Push once  dextrose 50% Injectable 25 Gram(s) IV Push once  enoxaparin Injectable 40 milliGRAM(s) SubCutaneous every 12 hours  furosemide    Tablet 40 milliGRAM(s) Oral every 24 hours  glucagon  Injectable 1 milliGRAM(s) IntraMuscular once  insulin glargine Injectable (LANTUS) 16 Unit(s) SubCutaneous at bedtime  insulin lispro (ADMELOG) corrective regimen sliding scale   SubCutaneous Before meals and at bedtime  insulin lispro Injectable (ADMELOG) 8 Unit(s) SubCutaneous three times a day before meals  iron sucrose IVPB 500 milliGRAM(s) IV Intermittent every 24 hours  levothyroxine 150 MICROGram(s) Oral daily  lisinopril 10 milliGRAM(s) Oral daily  pantoprazole    Tablet 40 milliGRAM(s) Oral before breakfast  predniSONE   Tablet 40 milliGRAM(s) Oral daily    MEDICATIONS  (PRN):  acetaminophen     Tablet .. 650 milliGRAM(s) Oral every 6 hours PRN Temp greater or equal to 38C (100.4F), Mild Pain (1 - 3)  dextrose Oral Gel 15 Gram(s) Oral once PRN Blood Glucose LESS THAN 70 milliGRAM(s)/deciliter  ondansetron Injectable 4 milliGRAM(s) IV Push every 8 hours PRN Nausea and/or Vomiting    ASSESSMENT / RECOMMENDATIONS    UDAY PARIS is a 85 yo F with PMHx of CHF. on oral diuretics, HTN, T2DM, hypothyroidism, CAD, COPD/asthma, no h/o intubation and not on any home O2/CPAP, BIBA accompanied by  for worsening SOB/cough x 1 wk. Hypercapnic on admission, no off Bipap and on NC. Mental status improving. Endocrinology consulted for diabetes management and hypothyroidism. She is currently on prednisone 40mg qd as well.       A1C: 8.9 %  BUN: 32  Creatinine: 0.79  GFR: 74  Weight: 110.7  BMI: 47.7    # Type 2 diabetes mellitus with hyperglycemia  - Please decrease to lantus   units at bedtime.   -  units before each meal.  - Continue lispro moderate dose sliding scale before meals and at bedtime.  - Patient's fingerstick glucose goal is 100-180 mg/dL.    - Discharge recommendations to be discussed.   - Patient can follow up at discharge with [facility] by calling [phone] to make an appointment.      # Hypothyroidism  - TSH 10.669. Repeat TSH 4.24, Free T4 1.39 (TSH suppressed after steroid)   - c/w PO levothyroxine 150 mcg daily  - - Advised to take in the morning on an empty stomach at least 30 minutes before other medications and food.     Case discussed with Dr. Stephens. Primary team updated.       Jeaneth Mendoza  Endocrinology Fellow    Service Pager: [phone]    SUBJECTIVE / INTERVAL HPI: Patient was seen and examined this morning.     Overnight events:  pt on oxygen while sleeping  glucose went down to 55 this morning  pt denies having hypoglycemic symptoms    CAPILLARY BLOOD GLUCOSE & INSULIN RECEIVED  198 mg/dL (05-09 @ 21:52)  137 mg/dL (05-10 @ 06:57)  124 mg/dL (05-10 @ 11:39)  192 mg/dL (05-10 @ 16:18) 8  254 mg/dL (05-10 @ 22:44) 16 + 6  glucose 55  102 mg/dL (05-11 @ 09:05)        PHYSICAL EXAM  Vital Signs Last 24 Hrs  T(C): 36.7 (11 May 2025 08:43), Max: 36.7 (11 May 2025 08:43)  T(F): 98.1 (11 May 2025 08:43), Max: 98.1 (11 May 2025 08:43)  HR: 71 (11 May 2025 08:43) (66 - 89)  BP: 121/70 (11 May 2025 08:43) (118/58 - 140/65)  BP(mean): 80 (10 May 2025 16:10) (80 - 84)  RR: 18 (11 May 2025 08:43) (18 - 20)  SpO2: 97% (11 May 2025 08:43) (94% - 100%)    Parameters below as of 11 May 2025 08:43  Patient On (Oxygen Delivery Method): nasal cannula  O2 Flow (L/min): 2      Constitutional: Awake, alert, in no acute distress.   HEENT: Normocephalic, atraumatic, JASE.  Respiratory: Lungs clear to ausculation bilaterally.   Cardiovascular: regular rhythm, normal S1 and S2, no audible murmurs.   GI: soft, non-tender, non-distended, bowel sounds present.  Extremities: No lower extremity edema.     LABS  CBC - WBC/HGB/HTC/PLT: 7.16/9.7/31.5/230 (05-11-25)  BMP - Na/K/Cl/Bicarb/BUN/Cr/Gluc/AG/eGFR: 144/4.2/102/36/32/0.79/55/6/74 (05-11-25)  Ca - 9.1 (05-11-25)  Phos - 3.0 (05-11-25)  Mg - 2.1 (05-11-25)  LFT - Alb/Tprot/Tbili/Dbili/AlkPhos/ALT/AST: 3.0/--/0.5/--/154/24/34 (05-09-25)    Thyroid Stimulating Hormone, Serum: 4.240 (05-09-25)  Total T4/Free T4: --/1.390 (05-09-25)      05-10-25 @ 07:01  -  05-11-25 @ 07:00  --------------------------------------------------------  IN: 200 mL / OUT: 1400 mL / NET: -1200 mL        MEDICATIONS  MEDICATIONS  (STANDING):  albuterol/ipratropium for Nebulization 3 milliLiter(s) Nebulizer every 4 hours  atorvastatin 40 milliGRAM(s) Oral at bedtime  dextrose 5%. 1000 milliLiter(s) (50 mL/Hr) IV Continuous <Continuous>  dextrose 5%. 1000 milliLiter(s) (100 mL/Hr) IV Continuous <Continuous>  dextrose 50% Injectable 25 Gram(s) IV Push once  dextrose 50% Injectable 12.5 Gram(s) IV Push once  dextrose 50% Injectable 25 Gram(s) IV Push once  enoxaparin Injectable 40 milliGRAM(s) SubCutaneous every 12 hours  furosemide    Tablet 40 milliGRAM(s) Oral every 24 hours  glucagon  Injectable 1 milliGRAM(s) IntraMuscular once  insulin glargine Injectable (LANTUS) 16 Unit(s) SubCutaneous at bedtime  insulin lispro (ADMELOG) corrective regimen sliding scale   SubCutaneous Before meals and at bedtime  insulin lispro Injectable (ADMELOG) 8 Unit(s) SubCutaneous three times a day before meals  iron sucrose IVPB 500 milliGRAM(s) IV Intermittent every 24 hours  levothyroxine 150 MICROGram(s) Oral daily  lisinopril 10 milliGRAM(s) Oral daily  pantoprazole    Tablet 40 milliGRAM(s) Oral before breakfast  predniSONE   Tablet 40 milliGRAM(s) Oral daily    MEDICATIONS  (PRN):  acetaminophen     Tablet .. 650 milliGRAM(s) Oral every 6 hours PRN Temp greater or equal to 38C (100.4F), Mild Pain (1 - 3)  dextrose Oral Gel 15 Gram(s) Oral once PRN Blood Glucose LESS THAN 70 milliGRAM(s)/deciliter  ondansetron Injectable 4 milliGRAM(s) IV Push every 8 hours PRN Nausea and/or Vomiting    ASSESSMENT / RECOMMENDATIONS    UDAY PARIS is a 83 yo F with PMHx of CHF. on oral diuretics, HTN, T2DM, hypothyroidism, CAD, COPD/asthma, no h/o intubation and not on any home O2/CPAP, BIBA accompanied by  for worsening SOB/cough x 1 wk. Hypercapnic on admission, no off Bipap and on NC. Mental status improving. Endocrinology consulted for diabetes management and hypothyroidism. She is currently on prednisone 40mg qd as well.       A1C: 8.9 %  BUN: 32  Creatinine: 0.79  GFR: 74  Weight: 110.7  BMI: 47.7    # Type 2 diabetes mellitus with hyperglycemia  - Please decrease to lantus 12  units at bedtime and make bed time sliding scale low dose  - c/w lispro 8  units before each meal.  - Continue lispro moderate dose sliding scale before meals   - Patient's fingerstick glucose goal is 100-180 mg/dL.    - Discharge recommendations to be discussed.   - Patient can follow up at discharge with [facility] by calling [phone] to make an appointment.      # Hypothyroidism  - TSH 10.669. Repeat TSH 4.24, Free T4 1.39 (TSH suppressed after steroid)   - c/w PO levothyroxine 150 mcg daily  - - Advised to take in the morning on an empty stomach at least 30 minutes before other medications and food.     Case discussed with Dr. Stephens. Primary team updated.       Jeaneth Mendoza  Endocrinology Fellow    Service Pager: [phone]

## 2025-05-11 NOTE — CHART NOTE - NSCHARTNOTEFT_GEN_A_CORE
This writer attempted to see pt 2x during her shift for psychiatric evaluation. Pt expressed preference to be seen for full evaluation tomorrow and does not want to speak with this writer today, as she had visitors and at one point had been soiled with feces. States that she currently does not feel suicidal, and states that she has not had any plan or intent to harm herself recently.    - will defer full evaluation to psychiatry consult team Mon 5/12.

## 2025-05-11 NOTE — PROGRESS NOTE ADULT - PROBLEM SELECTOR PLAN 11
Home meds: pantoprazole 40mg QD  - continue home meds. Home meds: pantoprazole 40mg QD    - continue home meds.

## 2025-05-11 NOTE — PROGRESS NOTE ADULT - PROBLEM SELECTOR PLAN 8
TSH elevated, presented hypothermic  May be iso infection however given elevated TSH will increase home dose  On home synthroid 137mcg   - increased synthroid to 150mcg.  - f/u endo recs TSH elevated, presented hypothermic  May be iso infection however given elevated TSH will increase home dose  On home synthroid 137mcg     - increased synthroid to 150mcg.  - f/u endo recs

## 2025-05-11 NOTE — PROGRESS NOTE ADULT - SUBJECTIVE AND OBJECTIVE BOX
HOSPITAL COURSE: 85 yo F with PMHx of CHF. on oral diuretics, HTN, hypothyroidism, CAD, COPD/asthma, no h/o intubation and not on any home O2/CPAP, BIBA accompanied by  for worsening SOB/cough x 1 wk. Admitted for acute on chronic respiratory failure with hypoxia and hypercapnia likely 2/2 obesity hypoventilation vs acute asthma exacerbation.    INTERVAL HPI/OVERNIGHT EVENTS: BRIAN    SUBJECTIVE: Patient seen and examined at bedside, resting comfortably in bed, in no acute distress. Patient reports    Vital Signs Last 24 Hrs  T(C): 36.3 (11 May 2025 05:31), Max: 36.7 (10 May 2025 09:51)  T(F): 97.4 (11 May 2025 05:31), Max: 98 (10 May 2025 09:51)  HR: 66 (11 May 2025 05:31) (66 - 89)  BP: 123/69 (11 May 2025 05:31) (114/58 - 140/65)  BP(mean): 80 (10 May 2025 16:10) (80 - 84)  RR: 18 (11 May 2025 05:31) (17 - 20)  SpO2: 98% (11 May 2025 05:31) (94% - 100%)    Parameters below as of 11 May 2025 05:31  Patient On (Oxygen Delivery Method): room air        PHYSICAL EXAM:  General: in no acute distress  Eyes: PERRL  HENT: Moist mucous membranes  Lungs: CTA B/L. No wheezes, rales, or rhonchi  Cardiovascular: RRR. No murmurs, rubs, or gallops  Abdomen: Soft, non-tender non-distended; No rebound or guarding  Extremities: WWP, no edema  Neurological: Alert and oriented  Skin: Warm and dry, no obvious rash    LABS:                        9.6    8.37  )-----------( 261      ( 10 May 2025 06:38 )             30.8     05-10    142  |  98  |  30[H]  ----------------------------<  150[H]  4.4   |  35[H]  |  0.77    Ca    9.8      10 May 2025 06:38  Phos  3.4     05-10  Mg     1.9     05-10     HOSPITAL COURSE: 85 yo F with PMHx of CHF. on oral diuretics, HTN, hypothyroidism, CAD, COPD/asthma, no h/o intubation and not on any home O2/CPAP, BIBA accompanied by  for worsening SOB/cough x 1 wk. Admitted for acute on chronic respiratory failure with hypoxia and hypercapnia likely 2/2 obesity hypoventilation vs acute asthma exacerbation.    INTERVAL HPI/OVERNIGHT EVENTS: Patient endorsing suicidal ideation overnight, no plans or imminent danger    SUBJECTIVE: Patient seen and examined at bedside, resting comfortably in bed, in no acute distress. Patient reports that her whole body hurts but breathing is okay and denies other acute complaints    Vital Signs Last 24 Hrs  T(C): 36.3 (11 May 2025 05:31), Max: 36.7 (10 May 2025 09:51)  T(F): 97.4 (11 May 2025 05:31), Max: 98 (10 May 2025 09:51)  HR: 66 (11 May 2025 05:31) (66 - 89)  BP: 123/69 (11 May 2025 05:31) (114/58 - 140/65)  BP(mean): 80 (10 May 2025 16:10) (80 - 84)  RR: 18 (11 May 2025 05:31) (17 - 20)  SpO2: 98% (11 May 2025 05:31) (94% - 100%)    Parameters below as of 11 May 2025 05:31  Patient On (Oxygen Delivery Method): room air      PHYSICAL EXAM:  General: in no acute distress  HEENT: PERRL, Moist mucous membranes  Lungs: CTA B/L. No wheezes, rales, or rhonchi  Cardiovascular: RRR. No murmurs, rubs, or gallops  Abdomen: Soft, non-tender non-distended; No rebound or guarding  Extremities: WWP, 1+ peripheral edema  Neurological: Alert and oriented  Skin: Warm and dry, no obvious rash    LABS:                        9.6    8.37  )-----------( 261      ( 10 May 2025 06:38 )             30.8     05-10    142  |  98  |  30[H]  ----------------------------<  150[H]  4.4   |  35[H]  |  0.77    Ca    9.8      10 May 2025 06:38  Phos  3.4     05-10  Mg     1.9     05-10

## 2025-05-11 NOTE — PROGRESS NOTE ADULT - PROBLEM SELECTOR PLAN 5
BMI 47.7. Consistent Carb Diet.   -promote diet + exercise on admission  -can set up with outpt dietician  -outpt hypoventilation as above BMI 47.7. Consistent Carb Diet.     -promote diet + exercise on admission  -can set up with outpt dietician  -outpt hypoventilation as above

## 2025-05-11 NOTE — PROGRESS NOTE ADULT - PROBLEM SELECTOR PLAN 4
BNP on admission, but CXR notable for infiltrates and 1+ peripheral edema on exam. 5/9 TTE showing grade II LV diastolic dysfunction, severely dilated LA, and EF ~60%. Reportedly takes lasix 40 mg qD  -c/w home lasix regimen, increase as needed if concerned for pulm edema vs more heart failure driven hypercapnia BNP on admission, but CXR notable for infiltrates and 1+ peripheral edema on exam. 5/9 TTE showing grade II LV diastolic dysfunction, severely dilated LA, and EF ~60%. Reportedly takes lasix 40 mg qD    -c/w home lasix regimen, increase as needed if concerned for pulm edema vs more heart failure driven hypercapnia

## 2025-05-11 NOTE — PROGRESS NOTE ADULT - PROBLEM SELECTOR PLAN 7
Home meds: carvedilol 6.25mg BID, lisinopril 10mg QD  - c/w home lisinopril  - hold home coreg iso asthma exacerbation. Home meds: carvedilol 6.25mg BID, lisinopril 10mg QD    - c/w home lisinopril  - hold home coreg iso asthma exacerbation.

## 2025-05-12 ENCOUNTER — TRANSCRIPTION ENCOUNTER (OUTPATIENT)
Age: 85
End: 2025-05-12

## 2025-05-12 LAB
ANION GAP SERPL CALC-SCNC: 8 MMOL/L — SIGNIFICANT CHANGE UP (ref 5–17)
BASOPHILS # BLD AUTO: 0.02 K/UL — SIGNIFICANT CHANGE UP (ref 0–0.2)
BASOPHILS NFR BLD AUTO: 0.2 % — SIGNIFICANT CHANGE UP (ref 0–2)
BUN SERPL-MCNC: 33 MG/DL — HIGH (ref 7–23)
CALCIUM SERPL-MCNC: 9.7 MG/DL — SIGNIFICANT CHANGE UP (ref 8.4–10.5)
CHLORIDE SERPL-SCNC: 98 MMOL/L — SIGNIFICANT CHANGE UP (ref 96–108)
CO2 SERPL-SCNC: 36 MMOL/L — HIGH (ref 22–31)
CREAT SERPL-MCNC: 0.83 MG/DL — SIGNIFICANT CHANGE UP (ref 0.5–1.3)
EGFR: 70 ML/MIN/1.73M2 — SIGNIFICANT CHANGE UP
EGFR: 70 ML/MIN/1.73M2 — SIGNIFICANT CHANGE UP
EOSINOPHIL # BLD AUTO: 0.03 K/UL — SIGNIFICANT CHANGE UP (ref 0–0.5)
EOSINOPHIL NFR BLD AUTO: 0.3 % — SIGNIFICANT CHANGE UP (ref 0–6)
GLUCOSE SERPL-MCNC: 90 MG/DL — SIGNIFICANT CHANGE UP (ref 70–99)
HCT VFR BLD CALC: 33.2 % — LOW (ref 34.5–45)
HGB BLD-MCNC: 10.4 G/DL — LOW (ref 11.5–15.5)
IMM GRANULOCYTES NFR BLD AUTO: 0.6 % — SIGNIFICANT CHANGE UP (ref 0–0.9)
LYMPHOCYTES # BLD AUTO: 2.39 K/UL — SIGNIFICANT CHANGE UP (ref 1–3.3)
LYMPHOCYTES # BLD AUTO: 26.9 % — SIGNIFICANT CHANGE UP (ref 13–44)
MAGNESIUM SERPL-MCNC: 2.2 MG/DL — SIGNIFICANT CHANGE UP (ref 1.6–2.6)
MCHC RBC-ENTMCNC: 27.9 PG — SIGNIFICANT CHANGE UP (ref 27–34)
MCHC RBC-ENTMCNC: 31.3 G/DL — LOW (ref 32–36)
MCV RBC AUTO: 89 FL — SIGNIFICANT CHANGE UP (ref 80–100)
MONOCYTES # BLD AUTO: 0.8 K/UL — SIGNIFICANT CHANGE UP (ref 0–0.9)
MONOCYTES NFR BLD AUTO: 9 % — SIGNIFICANT CHANGE UP (ref 2–14)
NEUTROPHILS # BLD AUTO: 5.59 K/UL — SIGNIFICANT CHANGE UP (ref 1.8–7.4)
NEUTROPHILS NFR BLD AUTO: 63 % — SIGNIFICANT CHANGE UP (ref 43–77)
NRBC BLD AUTO-RTO: 0 /100 WBCS — SIGNIFICANT CHANGE UP (ref 0–0)
PHOSPHATE SERPL-MCNC: 2.8 MG/DL — SIGNIFICANT CHANGE UP (ref 2.5–4.5)
PLATELET # BLD AUTO: 242 K/UL — SIGNIFICANT CHANGE UP (ref 150–400)
POTASSIUM SERPL-MCNC: 4.1 MMOL/L — SIGNIFICANT CHANGE UP (ref 3.5–5.3)
POTASSIUM SERPL-SCNC: 4.1 MMOL/L — SIGNIFICANT CHANGE UP (ref 3.5–5.3)
RBC # BLD: 3.73 M/UL — LOW (ref 3.8–5.2)
RBC # FLD: 17.4 % — HIGH (ref 10.3–14.5)
SODIUM SERPL-SCNC: 142 MMOL/L — SIGNIFICANT CHANGE UP (ref 135–145)
WBC # BLD: 8.88 K/UL — SIGNIFICANT CHANGE UP (ref 3.8–10.5)
WBC # FLD AUTO: 8.88 K/UL — SIGNIFICANT CHANGE UP (ref 3.8–10.5)

## 2025-05-12 RX ORDER — ALBUTEROL SULFATE 2.5 MG/3ML
2 VIAL, NEBULIZER (ML) INHALATION
Refills: 0 | DISCHARGE

## 2025-05-12 RX ORDER — LISINOPRIL 5 MG/1
1 TABLET ORAL
Refills: 0 | DISCHARGE

## 2025-05-12 RX ORDER — BUDESONIDE AND FORMOTEROL FUMARATE DIHYDRATE 80; 4.5 UG/1; UG/1
2 AEROSOL RESPIRATORY (INHALATION)
Qty: 1 | Refills: 0
Start: 2025-05-12

## 2025-05-12 RX ORDER — CARVEDILOL 3.12 MG/1
1 TABLET, FILM COATED ORAL
Refills: 0 | DISCHARGE

## 2025-05-12 RX ORDER — LEVOTHYROXINE SODIUM 300 MCG
1 TABLET ORAL
Qty: 30 | Refills: 0
Start: 2025-05-12 | End: 2025-06-10

## 2025-05-12 RX ORDER — FUROSEMIDE 10 MG/ML
1 INJECTION INTRAMUSCULAR; INTRAVENOUS
Refills: 0 | DISCHARGE

## 2025-05-12 RX ORDER — INSULIN ASPART 100 [IU]/ML
10 INJECTION, SUSPENSION SUBCUTANEOUS
Qty: 0 | Refills: 0 | DISCHARGE

## 2025-05-12 RX ORDER — ATORVASTATIN CALCIUM 80 MG/1
1 TABLET, FILM COATED ORAL
Refills: 0 | DISCHARGE

## 2025-05-12 RX ORDER — LEVOTHYROXINE SODIUM 300 MCG
1 TABLET ORAL
Refills: 0 | DISCHARGE

## 2025-05-12 RX ORDER — PREDNISONE 20 MG/1
2 TABLET ORAL
Qty: 4 | Refills: 0
Start: 2025-05-12 | End: 2025-05-13

## 2025-05-12 RX ADMIN — IPRATROPIUM BROMIDE AND ALBUTEROL SULFATE 3 MILLILITER(S): .5; 2.5 SOLUTION RESPIRATORY (INHALATION) at 15:56

## 2025-05-12 RX ADMIN — Medication 40 MILLIGRAM(S): at 06:24

## 2025-05-12 RX ADMIN — LISINOPRIL 10 MILLIGRAM(S): 5 TABLET ORAL at 06:21

## 2025-05-12 RX ADMIN — Medication 650 MILLIGRAM(S): at 03:21

## 2025-05-12 RX ADMIN — IPRATROPIUM BROMIDE AND ALBUTEROL SULFATE 3 MILLILITER(S): .5; 2.5 SOLUTION RESPIRATORY (INHALATION) at 03:22

## 2025-05-12 RX ADMIN — INSULIN LISPRO 8 UNIT(S): 100 INJECTION, SOLUTION INTRAVENOUS; SUBCUTANEOUS at 18:58

## 2025-05-12 RX ADMIN — IPRATROPIUM BROMIDE AND ALBUTEROL SULFATE 3 MILLILITER(S): .5; 2.5 SOLUTION RESPIRATORY (INHALATION) at 22:32

## 2025-05-12 RX ADMIN — INSULIN LISPRO 4: 100 INJECTION, SOLUTION INTRAVENOUS; SUBCUTANEOUS at 18:58

## 2025-05-12 RX ADMIN — ENOXAPARIN SODIUM 40 MILLIGRAM(S): 100 INJECTION SUBCUTANEOUS at 06:22

## 2025-05-12 RX ADMIN — IPRATROPIUM BROMIDE AND ALBUTEROL SULFATE 3 MILLILITER(S): .5; 2.5 SOLUTION RESPIRATORY (INHALATION) at 18:58

## 2025-05-12 RX ADMIN — ATORVASTATIN CALCIUM 40 MILLIGRAM(S): 80 TABLET, FILM COATED ORAL at 22:32

## 2025-05-12 RX ADMIN — ENOXAPARIN SODIUM 40 MILLIGRAM(S): 100 INJECTION SUBCUTANEOUS at 18:58

## 2025-05-12 RX ADMIN — INSULIN GLARGINE-YFGN 12 UNIT(S): 100 INJECTION, SOLUTION SUBCUTANEOUS at 22:31

## 2025-05-12 RX ADMIN — IPRATROPIUM BROMIDE AND ALBUTEROL SULFATE 3 MILLILITER(S): .5; 2.5 SOLUTION RESPIRATORY (INHALATION) at 11:44

## 2025-05-12 RX ADMIN — Medication 150 MICROGRAM(S): at 06:21

## 2025-05-12 RX ADMIN — INSULIN LISPRO 6: 100 INJECTION, SOLUTION INTRAVENOUS; SUBCUTANEOUS at 13:34

## 2025-05-12 RX ADMIN — IPRATROPIUM BROMIDE AND ALBUTEROL SULFATE 3 MILLILITER(S): .5; 2.5 SOLUTION RESPIRATORY (INHALATION) at 07:40

## 2025-05-12 RX ADMIN — PREDNISONE 40 MILLIGRAM(S): 20 TABLET ORAL at 06:21

## 2025-05-12 RX ADMIN — IPRATROPIUM BROMIDE AND ALBUTEROL SULFATE 3 MILLILITER(S): .5; 2.5 SOLUTION RESPIRATORY (INHALATION) at 00:36

## 2025-05-12 RX ADMIN — INSULIN LISPRO 8 UNIT(S): 100 INJECTION, SOLUTION INTRAVENOUS; SUBCUTANEOUS at 13:34

## 2025-05-12 NOTE — BH CONSULTATION LIAISON ASSESSMENT NOTE - DESCRIPTION
•	Residence: [where/with whom]: Lives in apartment with   •	Childhood [how was it for you growing up?] Grew up in [place], met her  in the [place] they [place] and were  at 16 ( was 20) Pt. moved to [facility] in her mid-20s.   •	Abuse history: Denied  •	Education: 9th grade  •	Relationship Status:   •	Work History: [employer]  •	Legal history: Denies  •	Substance Use: Denies

## 2025-05-12 NOTE — DISCHARGE NOTE PROVIDER - ATTENDING DISCHARGE PHYSICAL EXAMINATION:
PHYSICAL EXAM:  General: in no acute distress  HEENT: PERRL, Moist mucous membranes  Lungs: Decreased breath sounds B/L. No wheezes, rales, or rhonchi  Cardiovascular: RRR. No murmurs, rubs, or gallops  Abdomen: Obese, soft, non-tender non-distended; No rebound or guarding  Extremities: WWP, 1+ peripheral edema  Neurological: Alert and oriented x4  Skin: Warm and dry, no obvious rash

## 2025-05-12 NOTE — BH CONSULTATION LIAISON ASSESSMENT NOTE - CURRENT MEDICATION
MEDICATIONS  (STANDING):  albuterol/ipratropium for Nebulization 3 milliLiter(s) Nebulizer every 4 hours  atorvastatin 40 milliGRAM(s) Oral at bedtime  dextrose 5%. 1000 milliLiter(s) (50 mL/Hr) IV Continuous <Continuous>  dextrose 5%. 1000 milliLiter(s) (100 mL/Hr) IV Continuous <Continuous>  dextrose 50% Injectable 25 Gram(s) IV Push once  dextrose 50% Injectable 12.5 Gram(s) IV Push once  dextrose 50% Injectable 25 Gram(s) IV Push once  enoxaparin Injectable 40 milliGRAM(s) SubCutaneous every 12 hours  furosemide    Tablet 40 milliGRAM(s) Oral every 24 hours  glucagon  Injectable 1 milliGRAM(s) IntraMuscular once  insulin glargine Injectable (LANTUS) 12 Unit(s) SubCutaneous at bedtime  insulin lispro (ADMELOG) corrective regimen sliding scale   SubCutaneous three times a day before meals  insulin lispro (ADMELOG) corrective regimen sliding scale   SubCutaneous at bedtime  insulin lispro Injectable (ADMELOG) 8 Unit(s) SubCutaneous three times a day before meals  levothyroxine 150 MICROGram(s) Oral daily  lisinopril 10 milliGRAM(s) Oral daily  pantoprazole    Tablet 40 milliGRAM(s) Oral before breakfast  predniSONE   Tablet 40 milliGRAM(s) Oral daily    MEDICATIONS  (PRN):  acetaminophen     Tablet .. 650 milliGRAM(s) Oral every 6 hours PRN Temp greater or equal to 38C (100.4F), Mild Pain (1 - 3)  dextrose Oral Gel 15 Gram(s) Oral once PRN Blood Glucose LESS THAN 70 milliGRAM(s)/deciliter  ondansetron Injectable 4 milliGRAM(s) IV Push every 8 hours PRN Nausea and/or Vomiting

## 2025-05-12 NOTE — DISCHARGE NOTE PROVIDER - NSDCFUADDAPPT_GEN_ALL_CORE_FT
Please call [phone] to schedule an appointment with Dr. Lai Siddiqi at [address] [place] [address] within 2 weeks    Please schedule an appointment within 2 weeks with the [facility] by calling [phone]

## 2025-05-12 NOTE — OCCUPATIONAL THERAPY INITIAL EVALUATION ADULT - LEVEL OF CONSCIOUSNESS, OT EVAL
clobetasol (TEMOVATE) 0.05 % cream      Last Written Prescription Date:  7/30/15  Last Fill Quantity: 15g,   # refills: 0  Last Office Visit: 7/31/18  Future Office visit:       Routing refill request to provider for review/approval because:  Drug not on the [facility] refill protocol or controlled substance. Has not been prescribed since 2015. Please advise. Thank you!      
alert

## 2025-05-12 NOTE — CONSULT NOTE ADULT - REASON FOR ADMISSION
acute on chronic hypoxemic hypercapnic respiratory failure

## 2025-05-12 NOTE — BH CONSULTATION LIAISON ASSESSMENT NOTE - OTHER PAST PSYCHIATRIC HISTORY (INCLUDE DETAILS REGARDING ONSET, COURSE OF ILLNESS, INPATIENT/OUTPATIENT TREATMENT)
reports she saw a psychiatrist 8-9 years ago, but only received short term treatment and denied every being prescribed psychiatric medications. Denies any prior psychiatric hospitalizations or suicide attempts. Denies any substance use.

## 2025-05-12 NOTE — PHYSICAL THERAPY INITIAL EVALUATION ADULT - GENERAL OBSERVATIONS, REHAB EVAL
Received supine complaints of BLE pain 5/10 +IV hep, primafit, O2 NC 2L. left as found +RN aware, call bell

## 2025-05-12 NOTE — BH CONSULTATION LIAISON ASSESSMENT NOTE - NSBHCONSULTRECOMMENDOTHER_PSY_A_CORE FT
RECOMMENDATIONS:  - No indication for 1:1 or psychiatric hospitalization, pt denies SI  - No indication for starting psychotropics at this time  - Pt declines further psychiatric/psychological services.  - Please contact if new concerns arise.

## 2025-05-12 NOTE — PROGRESS NOTE ADULT - TIME BILLING
case complexity
case complexity
Bedside exam and interview   Reviewed vitals, labs   Discussed patient's plan of care with housestaff   Documentation of encounter    Time documented on encounter excludes teaching and separately reported services
Bedside exam and interview    Review of hospital course, labs, vitals, imaging ,  medical records.   Reviewing outside records   Discharge planning on Interdisciplinary rounds   Discussion with consultants and Primary team   Documenting the encounter.
Reviewing patient chart and history, Review of patient labs and imaging, obtaining and reviewing obtained history, performing medical examination and evaluation at bedside, counseling and educting patient, family, or caregiver, ordering additional tests, therapies, treatments or communicating with other healthcare professionals, documenting, and coordination of care.    Anderson Bar DO, PhD  Internal Medicine, Hospitalist  [email or web address]

## 2025-05-12 NOTE — DISCHARGE NOTE PROVIDER - HOSPITAL COURSE
HOSPITAL COURSE: 85 yo F with PMHx of CHF. on oral diuretics, HTN, hypothyroidism, CAD, COPD/asthma, no h/o intubation and not on any home O2/CPAP, BIBA accompanied by  for worsening SOB/cough x 1 wk. Admitted for acute on chronic respiratory failure with hypoxia and hypercapnia likely 2/2 obesity hypoventilation vs acute asthma exacerbation.    Problem List/Main Diagnoses:   #Acute respiratory failure with hypercapnia: Patient presented with pCO2 61 on ABG, complaining of worsening dyspnea and productive cough as well as chest pain/tightness. Endorses R sided pleurisy with intermittent wheezing, uses nebulizer tx and albuterol pump frequently. Significant snoring per her . Infectious workup negative. Saturating well on room air. CTA PE negative. CXR showing bilateral opacities with R hilar enlargement. Evaluated by pulm, less concerned for pneumonia will monitor off abx and treat as asthma exacerbation vs obesity hypoventilation. Duonebs q4h, incentive spirometry, home diuresis for heart failure, BiPAP nightly and with naps. Patient refusing in hospital and at home despite explaining risks and reasoning behind BiPAP. Outpatient pulm f/u for PFTs + sleep study. Discharge on symbicort 2 puffs BID and albuterol PRN. No need for steroids outpatient.    #Obesity hypoventilation syndrome: BMI 47.7 with  reporting frequent snoring. As above.     #Acute asthma exacerbation: Per surescripts, ventolin nebulizer used. Discharge on symbicort 2 puffs BID and albuterol PRN. No need for steroids outpatient.    #Heart failure, chronic systolic: BNP on admission, but CXR notable for infiltrates and 1+ peripheral edema on exam. 5/9 TTE showing grade II LV diastolic dysfunction, severely dilated LA, and EF ~60%. Reportedly takes lasix 40 mg qD. Continue home lasix regimen, increase as needed if concerned for pulm edema vs more heart failure driven hypercapnia.    #Morbid obesity: BMI 47.7. Consistent Carb Diet. Promote diet + exercise and outpatient dietician.    #Iron deficiency anemia: Presenting with Hgb 10.6 Total iron 25, Iron saturation 7%. Ferritin 16. Per ganzoni equation with target Hgb 12, deficit of 1138 mg. IV iron sucrose 500 mg x 2 (5/10-5/11)    #Hypertension: Home meds: carvedilol 6.25mg BID, lisinopril 10mg QD. Continue lisinopril, holding home coreg iso asthma exacerbation.    #Hypothyroid: TSH elevated, presented hypothermic. May be iso infection however given elevated TSH will increase home dose. On home synthroid 137mcg. Increased synthroid to 150mcg. Endo following.    #CAD (coronary artery disease): Home meds: atorvastatin 40mg QD. Continue.    #Type 2 diabetes mellitus: Home meds: Novolog, unsure dose. Endocrinology consulted. A1c 9.0 on admission. 12u lantus nightly, 8u lispro TID with meals. On discharge, novolog 70/30 10 units BID.    #GERD (gastroesophageal reflux disease): Home meds: pantoprazole 40mg QD. Continue.    New medications/therapies: synthroid 150mcg qd, symbicort 2 puffs BID, Novolog 70/30 10 units BID  Changes to old medications: Novolog 70/30 10 units BID, STOP synthroid 137 mcg qd  Labs to be followed outpatient: None  Exam to be followed outpatient: Endocrine, pulm, PCP, cardiology    Discharge plan: discharge to home

## 2025-05-12 NOTE — DISCHARGE NOTE PROVIDER - CARE PROVIDER_API CALL
Lai Siddiqi  Pulmonary Disease  [address] [place], [place] [address]  Phone: [phone]  Fax: [phone]  Follow Up Time: 2 weeks

## 2025-05-12 NOTE — PROGRESS NOTE ADULT - PROBLEM SELECTOR PLAN 9
Pt is still taking valsartan.  Advised to stop taking and that I would be sending in a rx for losartan to [facility] [address]   Home meds: atorvastatin 40mg QD    - continue home meds.

## 2025-05-12 NOTE — DISCHARGE NOTE PROVIDER - NSDCCPCAREPLAN_GEN_ALL_CORE_FT
PRINCIPAL DISCHARGE DIAGNOSIS  Diagnosis: Acute on chronic respiratory failure with hypercapnia  Assessment and Plan of Treatment: You came in to the hospital with shortness of breath and were found to have acute hypoxic respiratory failure with hypercapnia, which means that your lungs and breathing are not working properly which causes carbon dioxide to build up in your body, which can be dangerous. This was likely due to an asthma exacerbation combined with a chronic reapiratory failure called Obesity Hypoventilation Syndrome. This is a condition in which your lungs and respiratory system are compressed which causes difficulty breathing. You were started on a steroid, prednisone, as well as inhalers for your asthma. You will be discharged with an inhaler, symbicort, to take 2 puffs twice a day (which was sent to [facility] pharmacy in the hospital lobby), and continue to use your albuterol inhaler as needed. While you were in the hospital, your medical team prescribed a BiPAP machine for you to use nightly which will help your breathing at night, however you did not want to wear this device. It is important that you follow up with the pulmonology team outpatient within 2 weeks for further treatment outpatient, which may include pulmonary function tests and BiPAP or CPAP machine at night. Please call [phone] to schedule an appointment with Dr. Lai Siddiqi at [address] [place] [address].      SECONDARY DISCHARGE DIAGNOSES  Diagnosis: Hypothyroidism  Assessment and Plan of Treatment: You have a history of hypothyroidism and typically take Synthroid (levothyroxine) 137 mcg at home. While you were in the hospital, your thyroid numbers were abnormal, so the endocrine team evaluated you and increased your thyroid medication to 150 mcg daily. Please continue to take this new dose of medication (which has been sent to the [facility] pharmacy in the lobby of the hospital) and follow up with the endocrine team outpatient. Please schedule an appointment within 2 weeks with the [facility] by calling [phone].    Diagnosis: Type 2 diabetes mellitus  Assessment and Plan of Treatment: You have a history of diabetes and take insulin at home. While you were in the hospital, the endocrine team evaluated you and determined that you should continue to take your home medication of Novolog Mix 70/30 insulin, 10 units every 12 hours. Please schedule an appointment within 2 weeks with the [facility] by calling [phone].

## 2025-05-12 NOTE — BH CONSULTATION LIAISON ASSESSMENT NOTE - RISK ASSESSMENT
chronic risk is elevated due to hx of seeking psychiatric treatment; however acute risk is not elevated as pt denies any current or lifetime SI/HI/AVH. Cites multiple children, grandchildren, and  as strong protective factors.

## 2025-05-12 NOTE — BH CONSULTATION LIAISON ASSESSMENT NOTE - NSBHCONSULTFOLLOWAFTERCARE_PSY_A_CORE FT
Follow up with PCP.     If interested in further psychiatric services:  •	[facility]  o	[email or web address]  o	[address] 6th [place] [address]   [phone]  o	Medicare, Medicaid, most private insurances  •	[facility]  o	Monday – Friday: 7:30am – 1pm   o	[address]   [place] [address]  o	[phone]  o	Medicare, Medicaid, and all private insurances  •	[facility]  o	[address]   [facility], Ground Floor, [address]  [place] [address]  o	[phone]  •	[facility]  o	[email or web address]  o	Three locations  ?	[address] [place] [address]  Phone: [phone]  ?	[address] [facility] [address] [place] [address]  Phone: [phone]  ?	[address] [facility] [address] [place] [address]  Phone: [phone]  o	Medicare, Medicaid, most private insurance and sliding scale  •	[facility]  o	[email or web address]  o	[address] 9th [address]  [place], [place] [address]   [phone] ([phone] for intakes)

## 2025-05-12 NOTE — PROGRESS NOTE ADULT - PROBLEM SELECTOR PLAN 1
Patient presented with pCO2 61 on ABG, complaining of worsening dyspnea and productive cough as well as chest pain/tightness. Endorses R sided pleurisy with intermittent wheezing, uses nebulizer tx and albuterol pump frequently. Significant snoring per her . Infectious workup negative. Saturating well on room air. CTA PE negative. CXR showing bilateral opacities with R hilar enlargement. Evaluated by pulm, less concerned for pneumonia will monitor off abx and treat as asthma exacerbation vs obesity hypoventilation.     -standing duonebs q4h  -continue to diurese for HF as below, monitor bicarb  -BiPAP nightly and w/ naps, continue on discharge  -outpt pulm f/u for PFTs + sleep study  -incentive spirometry  -OOBTC Patient presented with pCO2 61 on ABG, complaining of worsening dyspnea and productive cough as well as chest pain/tightness. Endorses R sided pleurisy with intermittent wheezing, uses nebulizer tx and albuterol pump frequently. Significant snoring per her . Infectious workup negative. Saturating well on room air. CTA PE negative. CXR showing bilateral opacities with R hilar enlargement. Evaluated by pulm, less concerned for pneumonia will monitor off abx and treat as asthma exacerbation vs obesity hypoventilation.     -standing duonebs q4h  -continue to diurese for HF as below, monitor bicarb  -BiPAP nightly and w/ naps. Patient refusing in hospital and at home despite explaining risks and reasoning behind BiPAP  -outpt pulm f/u for PFTs + sleep study  -incentive spirometry  -OOBTC details…

## 2025-05-12 NOTE — PROGRESS NOTE ADULT - PROBLEM SELECTOR PLAN 12
F: as needed  E: K<4 Mg<2  N: carb consistent diet  A: as tolerated  GI ppx: none  DVT ppx: Lovenox 40mg subQ  Code: full  Dispo: CORA.

## 2025-05-12 NOTE — PROGRESS NOTE ADULT - SUBJECTIVE AND OBJECTIVE BOX
PULMONARY CONSULT SERVICE FOLLOW-UP NOTE    INTERVAL HPI:  Reviewed chart; patient seen and examined. She is currently on 3L NC and denies any problems with her breathing. She denies shortness of breath, chest tightness and wheezing.  She has not used BiPAP since admission.     MEDICATIONS:  Pulmonary:  albuterol/ipratropium for Nebulization 3 milliLiter(s) Nebulizer every 4 hours    Antimicrobials:    Anticoagulants:  enoxaparin Injectable 40 milliGRAM(s) SubCutaneous every 12 hours    Cardiac:  furosemide    Tablet 40 milliGRAM(s) Oral every 24 hours  lisinopril 10 milliGRAM(s) Oral daily      Allergies    Shrimp (Unknown)  penicillin (Unknown)    Intolerances        Vital Signs Last 24 Hrs  T(C): 36.3 (12 May 2025 08:56), Max: 36.6 (11 May 2025 17:16)  T(F): 97.4 (12 May 2025 08:56), Max: 97.9 (11 May 2025 17:16)  HR: 60 (12 May 2025 08:56) (60 - 73)  BP: 109/64 (12 May 2025 08:56) (101/72 - 129/71)  BP(mean): --  RR: 18 (12 May 2025 08:56) (18 - 18)  SpO2: 99% (12 May 2025 08:56) (97% - 100%)    Parameters below as of 12 May 2025 08:56  Patient On (Oxygen Delivery Method): nasal cannula  O2 Flow (L/min): 2          PHYSICAL EXAM:  Constitutional: obese, in no apparent respiratory distress  HEENT: anicteric sclera; moist mucous membranes  Neck: supple, no JVD appreciated  Cardiovascular: +S1/S2, regular rate and rhythm, no murmurs appreciated   Respiratory: on 2L NC. Clear breath sounds bilaterally. No wheezes, rhonchi or rales   Gastrointestinal: obese abdomen   Extremities: no edema  Neurological: awake and alert; conversational        LABS:  CBC Full  -  ( 12 May 2025 05:30 )  WBC Count : 8.88 K/uL  RBC Count : 3.73 M/uL  Hemoglobin : 10.4 g/dL  Hematocrit : 33.2 %  Platelet Count - Automated : 242 K/uL  Mean Cell Volume : 89.0 fl  Mean Cell Hemoglobin : 27.9 pg  Mean Cell Hemoglobin Concentration : 31.3 g/dL  Auto Neutrophil # : x  Auto Lymphocyte # : x  Auto Monocyte # : x  Auto Eosinophil # : x  Auto Basophil # : x  Auto Neutrophil % : x  Auto Lymphocyte % : x  Auto Monocyte % : x  Auto Eosinophil % : x  Auto Basophil % : x    05-12    142  |  98  |  33[H]  ----------------------------<  90  4.1   |  36[H]  |  0.83    Ca    9.7      12 May 2025 05:30  Phos  2.8     05-12  Mg     2.2     05-12            RADIOLOGY & ADDITIONAL STUDIES:  < from: CT Angio Chest PE Protocol w/ IV Cont (05.09.25 @ 14:19) >  ACC: [id number] EXAM:  CT ANGIO CHEST PULM ART WAWIC   ORDERED BY: GELA FALCON     PROCEDURE DATE:  05/09/2025          INTERPRETATION:  CLINICAL INFORMATION: Shortness of breath    COMPARISON: None.    CONTRAST/COMPLICATIONS:  IV Contrast: [id number]  70 cc administered   30 cc discarded  Oral Contrast: NONE  .    PROCEDURE:  CT Angiography of the Chest.  Sagittal and coronal reformats were performed as well as 3D (MIP)   reconstructions.    FINDINGS:    LUNGS AND LARGE AIRWAYS: Patent central airways. Mild dependent   atelectasis. No pulmonary consolidation or suspicious nodules.  PLEURA: No significant pleural effusion.  VESSELS: Images are degraded by respiratory motion and patient's large   body habitus. Artifact from upper extremities further limits the   evaluation. No central pulmonary emboli are seen. Apparent hypodensity of   branches of pulmonary artery to the right lower lobe, probably   artifactual.  HEART: Heart size is normal. No pericardial effusion.  MEDIASTINUM AND PEDRO: No lymphadenopathy.  CHEST WALL AND LOWER NECK: Within normal limits.  VISUALIZED UPPER ABDOMEN: Small Cholelithiasis. Left renal cyst.  BONES: Degenerative changes..    IMPRESSION:  Study limited by respiratory motion, large body habitus and suboptimal   positioning. No central pulmonary emboli visualized.    --- End of Report ---    < end of copied text >

## 2025-05-12 NOTE — PHYSICAL THERAPY INITIAL EVALUATION ADULT - SIT-TO-STAND BALANCE
[address] [place] [address] Physician Orders and Discharge Instructions  [address] [facility], [address] [facility]  Telephone: [phone]    NAME:  Sheron Emanuel:  1964  MEDICAL RECORD NUMBER:  [id number]  DATE:  9/2/2022      Wound care:  Continue to follow the instructions and recommendations from your last doctor visit. The dressing(s) applied is the same as your last visit. Please refer to your last discharge instruction for the information on your wound care. If there were any changes made, please follow the instructions as written here: none    Future Appointments     Future Appointments   Date Time Provider Eloina Zamarripa   9/6/2022 10:45 AM Jeannie Rodriguez MD [address] [facility]           Your nurse  is:  Tito Still     Electronically signed by David Quigley RN on 9/2/2022 at 11:55 AM     [address] [place] [address] Information: Should you experience any significant changes in your wound(s) or have questions about your wound care, please contact the [address] [place] [address] at [phone]. Our hours vary so please leave a message. Please give us 24-48 hours to return your call. If you need help with your wounds and cannot wait until we are available, contact your PCP or go to the hospital emergency room. Physician orders by:  Dr. Serena Arciniega        The information contained in the After Visit Summary has been reviewed with me, the patient and/or responsible adult, by my health care provider(s). I had the opportunity to ask questions regarding this information. I have elected to receive;      [] Patient unable to sign Discharge Instructions.  Given to ECF/Transportation/POA fair minus

## 2025-05-12 NOTE — PHYSICAL THERAPY INITIAL EVALUATION ADULT - PERTINENT HX OF CURRENT PROBLEM, REHAB EVAL
83 yo F with PMHx of CHF. on oral diuretics, HTN, T2DM, hypothyroidism, CAD, COPD/asthma, no h/o intubation and not on any home O2/CPAP, BIBA accompanied by  for worsening SOB/cough x 1 wk. Per , pt has been feeling worsening SOB with cough productive of small white-yellow sputum and pain with coughing. R sided pain, worse on deep inspiration and palpation. Noted increased intermittent wheezing and has been using neb tx and albuterol pump more frequently. No fevers but did note chills at home.  reports significant snoring at home. Denies substernal chest pain, palpitations, abdominal pain, nausea, vomiting, diarrhea, dysuria, hematuria, LE swelling, or rash.

## 2025-05-12 NOTE — OCCUPATIONAL THERAPY INITIAL EVALUATION ADULT - ADDITIONAL COMMENTS
Pt lives w/ her  in apt w/ elevator access. Pt states that she had HHA assist x6hrs/dy/5dys - ADLs and IADLs assist. Pt also reports possession of rollator and shower chair.

## 2025-05-12 NOTE — PROGRESS NOTE ADULT - PROBLEM SELECTOR PLAN 10
Home meds: Novolog, unsure dose. Endocrinology consulted. A1c 9.0 on admission.     -f/u endo recs.   -12u lantus nightly  -8u lispro mealtime  -mISS with meals and Sherri at bedtime  -outpt endo f/u

## 2025-05-12 NOTE — PROGRESS NOTE ADULT - SUBJECTIVE AND OBJECTIVE BOX
SUBJECTIVE / INTERVAL HPI: Patient was seen and examined this morning.     Overnight events:  No events     CAPILLARY BLOOD GLUCOSE & INSULIN RECEIVED  102 mg/dL (05-11 @ 09:05) 8 + 0  271 mg/dL (05-11 @ 13:18) 8 + 6  132 mg/dL (05-11 @ 17:46) 8 + 0  121 mg/dL (05-11 @ 22:12) lantus 12   107 mg/dL (05-12 @ 09:08) X        REVIEW OF SYSTEMS  Constitutional:  Negative fever, chills   Cardiovascular:  Negative for chest pain or palpitations.  Respiratory:  Negative for cough, wheezing, or shortness of breath.    Gastrointestinal:  Negative for nausea, vomiting, diarrhea, constipation, or abdominal pain.  Genitourinary:  Negative frequency, urgency or dysuria.  Neurologic:  No headache, confusion, dizziness, lightheadedness.    PHYSICAL EXAM  Vital Signs Last 24 Hrs  T(C): 36.3 (12 May 2025 08:56), Max: 36.6 (11 May 2025 17:16)  T(F): 97.4 (12 May 2025 08:56), Max: 97.9 (11 May 2025 17:16)  HR: 60 (12 May 2025 08:56) (60 - 73)  BP: 109/64 (12 May 2025 08:56) (101/72 - 129/71)  BP(mean): --  RR: 18 (12 May 2025 08:56) (18 - 18)  SpO2: 99% (12 May 2025 08:56) (97% - 100%)    Parameters below as of 12 May 2025 08:56  Patient On (Oxygen Delivery Method): nasal cannula  O2 Flow (L/min): 2      Constitutional: Awake, alert, in no acute distress.   HEENT: Normocephalic, atraumatic, JASE.  Respiratory: Lungs clear to ausculation bilaterally.   Cardiovascular: regular rhythm, normal S1 and S2, no audible murmurs.   GI: soft, non-tender, non-distended, bowel sounds present.  Extremities: No lower extremity edema.     LABS  CBC - WBC/HGB/HTC/PLT: 8.88/10.4/33.2/242 (05-12-25)  BMP - Na/K/Cl/Bicarb/BUN/Cr/Gluc/AG/eGFR: 142/4.1/98/36/33/0.83/90/8/70 (05-12-25)  Ca - 9.7 (05-12-25)  Phos - 2.8 (05-12-25)  Mg - 2.2 (05-12-25)  LFT - Alb/Tprot/Tbili/Dbili/AlkPhos/ALT/AST: 3.0/--/0.5/--/154/24/34 (05-09-25)    Thyroid Stimulating Hormone, Serum: 4.240 (05-09-25)  Total T4/Free T4: --/1.390 (05-09-25)        MEDICATIONS  MEDICATIONS  (STANDING):  albuterol/ipratropium for Nebulization 3 milliLiter(s) Nebulizer every 4 hours  atorvastatin 40 milliGRAM(s) Oral at bedtime  dextrose 5%. 1000 milliLiter(s) (50 mL/Hr) IV Continuous <Continuous>  dextrose 5%. 1000 milliLiter(s) (100 mL/Hr) IV Continuous <Continuous>  dextrose 50% Injectable 25 Gram(s) IV Push once  dextrose 50% Injectable 12.5 Gram(s) IV Push once  dextrose 50% Injectable 25 Gram(s) IV Push once  enoxaparin Injectable 40 milliGRAM(s) SubCutaneous every 12 hours  furosemide    Tablet 40 milliGRAM(s) Oral every 24 hours  glucagon  Injectable 1 milliGRAM(s) IntraMuscular once  insulin glargine Injectable (LANTUS) 12 Unit(s) SubCutaneous at bedtime  insulin lispro (ADMELOG) corrective regimen sliding scale   SubCutaneous three times a day before meals  insulin lispro (ADMELOG) corrective regimen sliding scale   SubCutaneous at bedtime  insulin lispro Injectable (ADMELOG) 8 Unit(s) SubCutaneous three times a day before meals  levothyroxine 150 MICROGram(s) Oral daily  lisinopril 10 milliGRAM(s) Oral daily  pantoprazole    Tablet 40 milliGRAM(s) Oral before breakfast  predniSONE   Tablet 40 milliGRAM(s) Oral daily    MEDICATIONS  (PRN):  acetaminophen     Tablet .. 650 milliGRAM(s) Oral every 6 hours PRN Temp greater or equal to 38C (100.4F), Mild Pain (1 - 3)  dextrose Oral Gel 15 Gram(s) Oral once PRN Blood Glucose LESS THAN 70 milliGRAM(s)/deciliter  ondansetron Injectable 4 milliGRAM(s) IV Push every 8 hours PRN Nausea and/or Vomiting    ASSESSMENT / RECOMMENDATIONS  UDAY PARIS is a 85 yo F with PMHx of CHF. on oral diuretics, HTN, T2DM, hypothyroidism, CAD, COPD/asthma, no h/o intubation and not on any home O2/CPAP, BIBA accompanied by  for worsening SOB/cough x 1 wk. Hypercapnic on admission, now off Bipap and on NC. Mental status back to baseline. Endocrinology consulted for diabetes management and hypothyroidism. She is currently on prednisone 40mg qd as well with end date 5/13.     A1C: 8.9 %  Creatinine: 0.83, GFR: 70  Weight: 110.7, BMI: 47.7    # Type 2 diabetes mellitus with hyperglycemia  - c/w lantus 12  units at bedtime and make bed time sliding scale low dose  - c/w lispro 8  units before each meal.  - Continue lispro moderate dose sliding scale before meals   - Patient's fingerstick glucose goal is 100-180 mg/dL.    - Discharge recommendations TBD.   - She does not want a GLP1.   - Patient can follow up at discharge with [facility] by calling [phone] to make an appointment.      # Hypothyroidism  - TSH 10.669. Repeat TSH 4.24, Free T4 1.39 (TSH suppressed after steroid)   - c/w PO levothyroxine 150 mcg daily  - Advised to take in the morning on an empty stomach at least 30 minutes before other medications and food.     Case discussed with Dr. Stephens. Primary team updated.       Linda Rowe   Endocrinology Fellow    Service Pager: [phone]      SUBJECTIVE / INTERVAL HPI: Patient was seen and examined this morning. She is still on prednisone 40mg qd which she gets at 6am. She has good appetite. She denies any food from outside. For breakfast she had pancake, eggs/ sausage. For dinner: salmon/ mashed potato. Lunch at bedside was chicken and mashed potato which she plans to eat.     Overnight events:  No events     CAPILLARY BLOOD GLUCOSE & INSULIN RECEIVED  102 mg/dL (05-11 @ 09:05) 8 + 0  271 mg/dL (05-11 @ 13:18) 8 + 6  132 mg/dL (05-11 @ 17:46) 8 + 0  121 mg/dL (05-11 @ 22:12) lantus 12   107 mg/dL (05-12 @ 09:08) X  261: 8 + 6    REVIEW OF SYSTEMS  Constitutional:  Negative fever, chills   Cardiovascular:  Negative for chest pain or palpitations.  Respiratory:  Negative for cough, wheezing, or shortness of breath.    Gastrointestinal:  Negative for nausea, vomiting, diarrhea, constipation, or abdominal pain.  Genitourinary:  Negative frequency, urgency or dysuria.  Neurologic:  No headache, confusion, dizziness, lightheadedness.    PHYSICAL EXAM  Vital Signs Last 24 Hrs  T(C): 36.3 (12 May 2025 08:56), Max: 36.6 (11 May 2025 17:16)  T(F): 97.4 (12 May 2025 08:56), Max: 97.9 (11 May 2025 17:16)  HR: 60 (12 May 2025 08:56) (60 - 73)  BP: 109/64 (12 May 2025 08:56) (101/72 - 129/71)  BP(mean): --  RR: 18 (12 May 2025 08:56) (18 - 18)  SpO2: 99% (12 May 2025 08:56) (97% - 100%)    Parameters below as of 12 May 2025 08:56  Patient On (Oxygen Delivery Method): nasal cannula  O2 Flow (L/min): 2      Constitutional: Awake, alert, in no acute distress.   HEENT: Normocephalic, atraumatic, JASE.  Respiratory: Lungs clear to ausculation bilaterally.   Cardiovascular: regular rhythm, normal S1 and S2, no audible murmurs.   GI: soft, non-tender, non-distended, bowel sounds present.  Extremities: No lower extremity edema.     LABS  CBC - WBC/HGB/HTC/PLT: 8.88/10.4/33.2/242 (05-12-25)  BMP - Na/K/Cl/Bicarb/BUN/Cr/Gluc/AG/eGFR: 142/4.1/98/36/33/0.83/90/8/70 (05-12-25)  Ca - 9.7 (05-12-25)  Phos - 2.8 (05-12-25)  Mg - 2.2 (05-12-25)  LFT - Alb/Tprot/Tbili/Dbili/AlkPhos/ALT/AST: 3.0/--/0.5/--/154/24/34 (05-09-25)    Thyroid Stimulating Hormone, Serum: 4.240 (05-09-25)  Total T4/Free T4: --/1.390 (05-09-25)        MEDICATIONS  MEDICATIONS  (STANDING):  albuterol/ipratropium for Nebulization 3 milliLiter(s) Nebulizer every 4 hours  atorvastatin 40 milliGRAM(s) Oral at bedtime  dextrose 5%. 1000 milliLiter(s) (50 mL/Hr) IV Continuous <Continuous>  dextrose 5%. 1000 milliLiter(s) (100 mL/Hr) IV Continuous <Continuous>  dextrose 50% Injectable 25 Gram(s) IV Push once  dextrose 50% Injectable 12.5 Gram(s) IV Push once  dextrose 50% Injectable 25 Gram(s) IV Push once  enoxaparin Injectable 40 milliGRAM(s) SubCutaneous every 12 hours  furosemide    Tablet 40 milliGRAM(s) Oral every 24 hours  glucagon  Injectable 1 milliGRAM(s) IntraMuscular once  insulin glargine Injectable (LANTUS) 12 Unit(s) SubCutaneous at bedtime  insulin lispro (ADMELOG) corrective regimen sliding scale   SubCutaneous three times a day before meals  insulin lispro (ADMELOG) corrective regimen sliding scale   SubCutaneous at bedtime  insulin lispro Injectable (ADMELOG) 8 Unit(s) SubCutaneous three times a day before meals  levothyroxine 150 MICROGram(s) Oral daily  lisinopril 10 milliGRAM(s) Oral daily  pantoprazole    Tablet 40 milliGRAM(s) Oral before breakfast  predniSONE   Tablet 40 milliGRAM(s) Oral daily    MEDICATIONS  (PRN):  acetaminophen     Tablet .. 650 milliGRAM(s) Oral every 6 hours PRN Temp greater or equal to 38C (100.4F), Mild Pain (1 - 3)  dextrose Oral Gel 15 Gram(s) Oral once PRN Blood Glucose LESS THAN 70 milliGRAM(s)/deciliter  ondansetron Injectable 4 milliGRAM(s) IV Push every 8 hours PRN Nausea and/or Vomiting    ASSESSMENT / RECOMMENDATIONS  UDAY PARIS is a 83 yo F with PMHx of CHF. on oral diuretics, HTN, T2DM, hypothyroidism, CAD, COPD/asthma, no h/o intubation and not on any home O2/CPAP, BIBA accompanied by  for worsening SOB/cough x 1 wk. Hypercapnic on admission, now off Bipap and on NC. Mental status back to baseline. Endocrinology consulted for diabetes management and hypothyroidism. She is currently on prednisone 40mg qd as well with end date 5/13. She did not get pre-breakfast insulin today but did end up eating later.     A1C: 8.9 %  Creatinine: 0.83, GFR: 70  Weight: 110.7, BMI: 47.7    # Type 2 diabetes mellitus with hyperglycemia  - decrease lantus 10  units at bedtime  - increase lispro ___ units before each meal.  - Continue lispro moderate dose sliding scale before meals, bed time low insulin sliding scale.   - Patient's fingerstick glucose goal is 100-180 mg/dL.    - Discharge recommendations TBD.   - She does not want a GLP1.   - Patient can follow up at discharge with [facility] by calling [phone] to make an appointment.      # Hypothyroidism  - TSH 10.669. Repeat TSH 4.24, Free T4 1.39 (TSH suppressed after steroid)   - c/w PO levothyroxine 150 mcg daily  - Advised to take in the morning on an empty stomach at least 30 minutes before other medications and food.     Case discussed with Dr. Stephens. Primary team updated.       Linda Rowe   Endocrinology Fellow    Service Pager: [phone]      SUBJECTIVE / INTERVAL HPI: Patient was seen and examined this morning. She is still on prednisone 40mg qd which she gets at 6am. She has good appetite. She denies any food from outside. For breakfast she had pancake, eggs/ sausage. For dinner: salmon/ mashed potato. Lunch at bedside was chicken and mashed potato which she plans to eat.     Overnight events:  No events     CAPILLARY BLOOD GLUCOSE & INSULIN RECEIVED  102 mg/dL (05-11 @ 09:05) 8 + 0  271 mg/dL (05-11 @ 13:18) 8 + 6  132 mg/dL (05-11 @ 17:46) 8 + 0  121 mg/dL (05-11 @ 22:12) lantus 12   107 mg/dL (05-12 @ 09:08) X  261: 8 + 6    REVIEW OF SYSTEMS  Constitutional:  Negative fever, chills   Cardiovascular:  Negative for chest pain or palpitations.  Respiratory:  Negative for cough, wheezing, or shortness of breath.    Gastrointestinal:  Negative for nausea, vomiting, diarrhea, constipation, or abdominal pain.  Genitourinary:  Negative frequency, urgency or dysuria.  Neurologic:  No headache, confusion, dizziness, lightheadedness.    PHYSICAL EXAM  Vital Signs Last 24 Hrs  T(C): 36.3 (12 May 2025 08:56), Max: 36.6 (11 May 2025 17:16)  T(F): 97.4 (12 May 2025 08:56), Max: 97.9 (11 May 2025 17:16)  HR: 60 (12 May 2025 08:56) (60 - 73)  BP: 109/64 (12 May 2025 08:56) (101/72 - 129/71)  BP(mean): --  RR: 18 (12 May 2025 08:56) (18 - 18)  SpO2: 99% (12 May 2025 08:56) (97% - 100%)    Parameters below as of 12 May 2025 08:56  Patient On (Oxygen Delivery Method): nasal cannula  O2 Flow (L/min): 2      Constitutional: Awake, alert, in no acute distress.   HEENT: Normocephalic, atraumatic, JASE.  Respiratory: Lungs clear to ausculation bilaterally.   Cardiovascular: regular rhythm, normal S1 and S2, no audible murmurs.   GI: soft, non-tender, non-distended, bowel sounds present.  Extremities: No lower extremity edema.     LABS  CBC - WBC/HGB/HTC/PLT: 8.88/10.4/33.2/242 (05-12-25)  BMP - Na/K/Cl/Bicarb/BUN/Cr/Gluc/AG/eGFR: 142/4.1/98/36/33/0.83/90/8/70 (05-12-25)  Ca - 9.7 (05-12-25)  Phos - 2.8 (05-12-25)  Mg - 2.2 (05-12-25)  LFT - Alb/Tprot/Tbili/Dbili/AlkPhos/ALT/AST: 3.0/--/0.5/--/154/24/34 (05-09-25)    Thyroid Stimulating Hormone, Serum: 4.240 (05-09-25)  Total T4/Free T4: --/1.390 (05-09-25)        MEDICATIONS  MEDICATIONS  (STANDING):  albuterol/ipratropium for Nebulization 3 milliLiter(s) Nebulizer every 4 hours  atorvastatin 40 milliGRAM(s) Oral at bedtime  dextrose 5%. 1000 milliLiter(s) (50 mL/Hr) IV Continuous <Continuous>  dextrose 5%. 1000 milliLiter(s) (100 mL/Hr) IV Continuous <Continuous>  dextrose 50% Injectable 25 Gram(s) IV Push once  dextrose 50% Injectable 12.5 Gram(s) IV Push once  dextrose 50% Injectable 25 Gram(s) IV Push once  enoxaparin Injectable 40 milliGRAM(s) SubCutaneous every 12 hours  furosemide    Tablet 40 milliGRAM(s) Oral every 24 hours  glucagon  Injectable 1 milliGRAM(s) IntraMuscular once  insulin glargine Injectable (LANTUS) 12 Unit(s) SubCutaneous at bedtime  insulin lispro (ADMELOG) corrective regimen sliding scale   SubCutaneous three times a day before meals  insulin lispro (ADMELOG) corrective regimen sliding scale   SubCutaneous at bedtime  insulin lispro Injectable (ADMELOG) 8 Unit(s) SubCutaneous three times a day before meals  levothyroxine 150 MICROGram(s) Oral daily  lisinopril 10 milliGRAM(s) Oral daily  pantoprazole    Tablet 40 milliGRAM(s) Oral before breakfast  predniSONE   Tablet 40 milliGRAM(s) Oral daily    MEDICATIONS  (PRN):  acetaminophen     Tablet .. 650 milliGRAM(s) Oral every 6 hours PRN Temp greater or equal to 38C (100.4F), Mild Pain (1 - 3)  dextrose Oral Gel 15 Gram(s) Oral once PRN Blood Glucose LESS THAN 70 milliGRAM(s)/deciliter  ondansetron Injectable 4 milliGRAM(s) IV Push every 8 hours PRN Nausea and/or Vomiting    ASSESSMENT / RECOMMENDATIONS  UDAY PARIS is a 83 yo F with PMHx of CHF. on oral diuretics, HTN, T2DM, hypothyroidism, CAD, COPD/asthma, no h/o intubation and not on any home O2/CPAP, BIBA accompanied by  for worsening SOB/cough x 1 wk. Hypercapnic on admission, now off Bipap and on NC. Mental status back to baseline. Endocrinology consulted for diabetes management and hypothyroidism. She is currently on prednisone 40mg qd as well with end date 5/13. She did not get pre-breakfast insulin today but did end up eating later.     A1C: 8.9 %  Creatinine: 0.83, GFR: 70  Weight: 110.7, BMI: 47.7    # Type 2 diabetes mellitus with hyperglycemia  - decrease lantus 10  units at bedtime  - increase lispro 10 units before each meal.  - Continue lispro moderate dose sliding scale before meals, bed time low insulin sliding scale.   - Patient's fingerstick glucose goal is 100-180 mg/dL.    - Discharge recommendations: She is on Novolog MIX 70/30 at home and prefers to stay on this. Can discharged on Novolog 70/30 10 units BID.    - She was offered GLP1 and she does not want any other medication besides insulin.   - Patient can follow up at discharge with [facility] by calling [phone] to make an appointment.      # Hypothyroidism  - TSH 10.669. Repeat TSH 4.24, Free T4 1.39 (TSH suppressed after steroid)   - c/w PO levothyroxine 150 mcg daily. Can be discharged on this dose.   - Advised to take in the morning on an empty stomach at least 30 minutes before other medications and food.     Case discussed with Dr. Stephens. Primary team updated.       Linda Rowe   Endocrinology Fellow    Service Pager: [phone]

## 2025-05-12 NOTE — BH CONSULTATION LIAISON ASSESSMENT NOTE - SUMMARY
Pt. is an 83 y/o woman who was consulted by psychiatry with help of translation services due to concern for SI. Per primary team, pt's roommate and well as nurse became concerned that pt made a statement about "not wanting to be here anymore". Upon consult, pt. denies any SI or NSSI, and no current psychiatric complaints. Pt. was unsure as to how the team came to this concern. Pt. described experiencing pain in her foot that is affecting her mood, and difficulty sleeping due to her TV watching. Pt. listed several protective factors (Family and Tawnya being the biggest).     Pt. reports previously seeing a psychiatrist a few years back in relation to her ’s alcohol use. There is the possibility that pt. has experienced emotional or psychiatric distress in the past in relation to this, however currently pt. is at low psychiatric acuity.     Pt. has not requested psychiatric medication or follow up care at this time.

## 2025-05-12 NOTE — PROGRESS NOTE ADULT - SUBJECTIVE AND OBJECTIVE BOX
HOSPITAL COURSE: 85 yo F with PMHx of CHF. on oral diuretics, HTN, hypothyroidism, CAD, COPD/asthma, no h/o intubation and not on any home O2/CPAP, BIBA accompanied by  for worsening SOB/cough x 1 wk. Admitted for acute on chronic respiratory failure with hypoxia and hypercapnia likely 2/2 obesity hypoventilation vs acute asthma exacerbation.    INTERVAL HPI/OVERNIGHT EVENTS: BRIAN    SUBJECTIVE: Patient seen and examined at bedside, resting comfortably in bed, in no acute distress. Patient reports     Vital Signs Last 24 Hrs  T(C): 36.3 (12 May 2025 05:30), Max: 36.7 (11 May 2025 08:43)  T(F): 97.4 (12 May 2025 05:30), Max: 98.1 (11 May 2025 08:43)  HR: 64 (12 May 2025 05:30) (64 - 73)  BP: 101/72 (12 May 2025 05:30) (101/72 - 129/71)  BP(mean): --  RR: 18 (12 May 2025 05:30) (18 - 18)  SpO2: 100% (12 May 2025 05:30) (97% - 100%)    Parameters below as of 11 May 2025 21:22  Patient On (Oxygen Delivery Method): nasal cannula  O2 Flow (L/min): 2    PHYSICAL EXAM:  General: in no acute distress  HEENT: PERRL, Moist mucous membranes  Lungs: CTA B/L. No wheezes, rales, or rhonchi  Cardiovascular: RRR. No murmurs, rubs, or gallops  Abdomen: Soft, non-tender non-distended; No rebound or guarding  Extremities: WWP, 1+ peripheral edema  Neurological: Alert and oriented  Skin: Warm and dry, no obvious rash    .  LABS:                         9.7    7.16  )-----------( 230      ( 11 May 2025 05:30 )             31.5     05-11    144  |  102  |  32[H]  ----------------------------<  55[L]  4.2   |  36[H]  |  0.79    Ca    9.1      11 May 2025 05:30  Phos  3.0     05-11  Mg     2.1     05-11        Urinalysis Basic - ( 11 May 2025 05:30 )    Color: x / Appearance: x / SG: x / pH: x  Gluc: 55 mg/dL / Ketone: x  / Bili: x / Urobili: x   Blood: x / Protein: x / Nitrite: x   Leuk Esterase: x / RBC: x / WBC x   Sq Epi: x / Non Sq Epi: x / Bacteria: x                RADIOLOGY, EKG & ADDITIONAL TESTS: Reviewed.  HOSPITAL COURSE: 85 yo F with PMHx of CHF. on oral diuretics, HTN, hypothyroidism, CAD, COPD/asthma, no h/o intubation and not on any home O2/CPAP, BIBA accompanied by  for worsening SOB/cough x 1 wk. Admitted for acute on chronic respiratory failure with hypoxia and hypercapnia likely 2/2 obesity hypoventilation vs acute asthma exacerbation.    INTERVAL HPI/OVERNIGHT EVENTS: Refused BiPAP overnight    SUBJECTIVE: Patient seen and examined at bedside, resting comfortably in bed, in no acute distress. Patient reports feeling "regular" this morning, no acute complaints. Breathing is fine. When patient is asked why she refused her BiPAP overnight, she states that she does not think she needs it because she is breathing fine after she gets her breathing treatments. Explained to patient that her SOB was the reason that she came in to the hospital, and even though her breathing treatments may make her symptoms better, her breathing problems are chronic and the BiPAP will help her breathe normally at night. Patient reiterated that she does not want to use the BiPAP in the hospital or at home.  *Tele  used for visit    Vital Signs Last 24 Hrs  T(C): 36.3 (12 May 2025 05:30), Max: 36.7 (11 May 2025 08:43)  T(F): 97.4 (12 May 2025 05:30), Max: 98.1 (11 May 2025 08:43)  HR: 64 (12 May 2025 05:30) (64 - 73)  BP: 101/72 (12 May 2025 05:30) (101/72 - 129/71)  BP(mean): --  RR: 18 (12 May 2025 05:30) (18 - 18)  SpO2: 100% (12 May 2025 05:30) (97% - 100%)    Parameters below as of 11 May 2025 21:22  Patient On (Oxygen Delivery Method): nasal cannula  O2 Flow (L/min): 2    PHYSICAL EXAM:  General: in no acute distress  HEENT: PERRL, Moist mucous membranes  Lungs: Decreased breath sounds B/L. No wheezes, rales, or rhonchi  Cardiovascular: RRR. No murmurs, rubs, or gallops  Abdomen: Obese, soft, non-tender non-distended; No rebound or guarding  Extremities: WWP, 1+ peripheral edema  Neurological: Alert and oriented x4  Skin: Warm and dry, no obvious rash    .  LABS:                         9.7    7.16  )-----------( 230      ( 11 May 2025 05:30 )             31.5     05-11    144  |  102  |  32[H]  ----------------------------<  55[L]  4.2   |  36[H]  |  0.79    Ca    9.1      11 May 2025 05:30  Phos  3.0     05-11  Mg     2.1     05-11        Urinalysis Basic - ( 11 May 2025 05:30 )    Color: x / Appearance: x / SG: x / pH: x  Gluc: 55 mg/dL / Ketone: x  / Bili: x / Urobili: x   Blood: x / Protein: x / Nitrite: x   Leuk Esterase: x / RBC: x / WBC x   Sq Epi: x / Non Sq Epi: x / Bacteria: x                RADIOLOGY, EKG & ADDITIONAL TESTS: Reviewed.

## 2025-05-12 NOTE — PROGRESS NOTE ADULT - PROBLEM SELECTOR PLAN 2
BMI 47.7 with  reporting frequent snoring. As above.

## 2025-05-12 NOTE — BH CONSULTATION LIAISON ASSESSMENT NOTE - DIFFERENTIAL
R/o Adjustment disorder if mood exacerbates beyond what is expected of chronic pain and medical/health conditions  Potential trauma diagnosis in relation to Hx of ’s past alcohol use, but that would need further assessment.

## 2025-05-12 NOTE — CONSULT NOTE ADULT - ASSESSMENT
I M    85 yo F with PMHx of CHF. on oral diuretics, HTN, hypothyroidism, CAD, COPD/asthma, no h/o intubation and not on any home O2/CPAP, BIBA accompanied by  for worsening SOB/cough x 1 wk. Admitted for acute on chronic respiratory failure with hypoxia and hypercapnia likely 2/2 obesity hypoventilation vs acute asthma exacerbation.    Problem/Plan - 1:  ·  Problem: Acute respiratory failure with hypercapnia.   ·  Plan: Patient presented with pCO2 61 on ABG, complaining of worsening dyspnea and productive cough as well as chest pain/tightness. Endorses R sided pleurisy with intermittent wheezing, uses nebulizer tx and albuterol pump frequently. Significant snoring per her . Infectious workup negative. Saturating well on room air. CTA PE negative. CXR showing bilateral opacities with R hilar enlargement. Evaluated by pulm, less concerned for pneumonia will monitor off abx and treat as asthma exacerbation vs obesity hypoventilation.     -standing duonebs q4h  -continue to diurese for HF as below, monitor bicarb  -BiPAP nightly and w/ naps, continue on discharge  -outpt pulm f/u for PFTs + sleep study  -incentive spirometry  -OOBTC.    Problem/Plan - 2:  ·  Problem: Obesity hypoventilation syndrome.   ·  Plan: BMI 47.7 with  reporting frequent snoring. As above.    Problem/Plan - 3:  ·  Problem: Acute asthma exacerbation.   ·  Plan: Per surescripts, ventolin nebulizer used.    -as above.    Problem/Plan - 4:  ·  Problem: Heart failure, chronic systolic.   ·  Plan: BNP on admission, but CXR notable for infiltrates and 1+ peripheral edema on exam. 5/9 TTE showing grade II LV diastolic dysfunction, severely dilated LA, and EF ~60%. Reportedly takes lasix 40 mg qD    -c/w home lasix regimen, increase as needed if concerned for pulm edema vs more heart failure driven hypercapnia.    Problem/Plan - 5:  ·  Problem: Morbid obesity.   ·  Plan: BMI 47.7. Consistent Carb Diet.     -promote diet + exercise on admission  -can set up with outpt dietician  -outpt hypoventilation as above.    Problem/Plan - 6:  ·  Problem: Iron deficiency anemia.   ·  Plan: Presenting with Hgb 10.6 Total iron 25, Iron saturation 7%. Ferritin 16.  Per ganzoni equation with target Hgb 12, deficit of 1138 mg.     -IV iron sucrose 500 mg x 2 (5/10-5/11)  -replete orally for remainder of deficit.    Problem/Plan - 7:  ·  Problem: Hypertension.   ·  Plan: Home meds: carvedilol 6.25mg BID, lisinopril 10mg QD    - c/w home lisinopril  - hold home coreg iso asthma exacerbation.    Problem/Plan - 8:  ·  Problem: Hypothyroid.   ·  Plan: TSH elevated, presented hypothermic  May be iso infection however given elevated TSH will increase home dose  On home synthroid 137mcg     - increased synthroid to 150mcg.  - f/u endo recs.    Problem/Plan - 9:  ·  Problem: CAD (coronary artery disease).   ·  Plan: Home meds: atorvastatin 40mg QD    - continue home meds.    Problem/Plan - 10:  ·  Problem: Type 2 diabetes mellitus.   ·  Plan; Home meds: Novolog, unsure dose. Endocrinology consulted. A1c 9.0 on admission.     -f/u endo recs.   -12u lantus nightly  -8u lispro mealtime  -mISS with meals and Sherri at bedtime  -outpt endo f/u.    Problem/Plan - 11:  ·  Problem: GERD (gastroesophageal reflux disease).   ·  Plan: Home meds: pantoprazole 40mg QD    - continue home meds.    Problem/Plan - 12:  ·  Problem: Prophylactic measure.   ·  Plan: F: as needed  E: K<4 Mg<2  N: carb consistent diet  A: as tolerated  GI ppx: none  DVT ppx: Lovenox 40mg subQ  Code: full  Dispo: [facility].    
84 year old female with CHF, HTN, T2DM, hypothyroidism, CAD, asthma, and obesity who was brought in for shortness of breath and cough for one week. Patient was hypoxic and hypercarbic in the ED requiring BiPAP. Pulmonary consulted for hypoxic/hypercapnic respiratory failure    Data review:  BMI 47.7  ABG 7.42/61/93 on 2L NC  HCO3 36  CTA chest: no central pulmonary embolism; enlarged vessels; bilateral atelectasis     Patient presenting with hypoxic and hypercapnic respiratory failure likely from undiagnosed BREEZY/OHS, there is also likely a fluid component given her grade II diastolic dysfunction. Hypoventilation and chronic hypercarbia is evident on her ABG. Her bicarbonate is slightly higher than her expected for the degree of hypercapnia. Given hypercarbia on her ABG, patient should be on nightly NIV.     Recommendations:  - Continue with standing nebulizers  - Does not appear to have a pneumonia, would discontinue antibiotics  - Continue with diuresis, monitor for raising bicarbonate on BMP  - BiPAP nightly and with naps, should be continued on discharge given ABG  - Wean supplemental O2 as tolerated to maintain SpO2 >92%  - Will need outpatient pulmonary follow up for PFTs and sleep study  - Please provide patient with incentive spirometer to prevent further atelectasis  - Encourage OOBTC and ambulation and tolerated     Discussed with Dr. Siddiqi

## 2025-05-12 NOTE — BH CONSULTATION LIAISON ASSESSMENT NOTE - NSBHATTESTCOMMENTATTENDFT_PSY_A_CORE
Seen with psychology intern.  83 yo F with PMHx of CHF on oral diuretics, HTN, T2DM, hypothyroidism, CAD, COPD/asthma, no h/o intubation and not on any home O2/CPAP, BIBA accompanied by  for worsening SOB/cough x 1 wk. Psychiatry consulted for passive SI.  Per primary team, concern for SI was relayed to them by nursing staff as well as pt's former roommate, who informed them pt made a statement about "not wanting to be here anymore".  On exam today, besides reporting physical pain she denies any other distress, shows no depressive symptoms, and shows bright, euthymic, full range affect. Adamantly denies SI, current or historical. 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 She denies interest in further psychiatric services at this time.  Pt does not currently meet criteria for 1:1 or psychiatric hospitalization. Please contact us if further or new questions arise.

## 2025-05-12 NOTE — PROGRESS NOTE ADULT - ATTENDING COMMENTS
83 yo F with PMHx of CHF. on oral diuretics, HTN, hypothyroidism, CAD, COPD/asthma, no h/o intubation and not on any home O2/CPAP, BIBA accompanied by  for worsening SOB/cough x 1 wk. Admitted for c/f asthma/COPD exacerbation iso underlying severe BREEZY.    Labs and imaging reviewed    Problem List  #Acute on Chronic Hypoxic and Hypercapnic Respiratory Failure  #Obesity Hypoventilation Syndrome  #Acute Asthma Exacerbation  #BREEZY  #Chronic Systolic Heart Failure  #Iron Deficiency Anemia    Plan  -Continue nightly BiPAP may require BiPAP at home  -PT/OT  -Can diuresis again today  -D/C Tele as no further indication for remote tele monitoring, if patient is in respiratory distress this would require step-down tele               Severe Sepsis was previously documented, patient does not meet Sepsis criteria, Severe Sepsis was ruled out
Patient clinically improved and will plan to treat like asthma but will stop steroids as favor that this is likely related to her undiagnosed OHS. Refusing bipap and explained why she needs to wear this but still refused. Will plan for PFT and sleep study as outpatient for further workup of BREEZY and OHS. All other labs and imaging reviewed.
Glucose levels 254  last night  and this AM55(103). I agree with decrease in Lantus Insulin to 12 units with continuing 8 units pre-meal Lispro Insulin
The patient is still receiving 40 mg Prednisone daily, which will be stopped tomorrow. Glucose levels were 132-121 last night and today 107--no premeal Insulin then 261. I agree with treating with 10 units Lantus Insulin and 10 units pre-meal Lispro Insulin.
85 yo F with PMHx of CHF. on oral diuretics, HTN, hypothyroidism, CAD, COPD/asthma, no h/o intubation and not on any home O2/CPAP, BIBA accompanied by  for worsening SOB/cough x 1 wk. Admitted for c/f asthma/COPD exacerbation iso underlying severe BREEZY.      #Acute on Chronic Hypoxic and Hypercapnic Respiratory Failure  #Obesity Hypoventilation Syndrome  #Acute Asthma Exacerbation  #BREEZY  #Chronic Systolic Heart Failure  #Iron Deficiency Anemia  #DM    Plan  -Continue nightly BiPAP may require BiPAP at home  -PT/OT  -patient not currently suicidal, but reported some suicidal ideation to room mate overnight. Discussed with BH today , no indication for involuntary commitment, no indication for 1;1   -Decrease Lantus insulin to 12units and continue w/ lispro 8 units TID AC    DVT ppx: Lovenox  DISPO: pending PT/OT eval .
85 yo F with PMHx of CHF. on oral diuretics, HTN, hypothyroidism, CAD, COPD/asthma, no h/o intubation and not on any home O2/CPAP, BIBA accompanied by  for worsening SOB/cough x 1 wk. Admitted for c/f asthma/COPD exacerbation iso underlying severe BREEZY.      #Acute on Chronic Hypoxic and Hypercapnic Respiratory Failure  #Obesity Hypoventilation Syndrome  #Acute Asthma Exacerbation  #BREEZY  #Chronic Systolic Heart Failure  #Iron Deficiency Anemia  #DM    Plan  -Continue nightly BiPAP may require BiPAP at home  -PT/OT  -Can diuresis again today  -patient not currently suicidal, but reported some suicidal ideation to room mate overnight.   - consult   -Decrease lantuse insulin to 12units and continue w/ lispro 8 units TID AC    DVT ppx: Lovenox  DISPO: pending PT/OT eval

## 2025-05-12 NOTE — CONSULT NOTE ADULT - SUBJECTIVE AND OBJECTIVE BOX
Patient is a 84y old  Female who presents with a chief complaint of acute on chronic hypoxemic hypercapnic respiratory failure (12 May 2025 07:25)      HPI:  85 yo F with PMHx of CHF. on oral diuretics, HTN, T2DM, hypothyroidism, CAD, COPD/asthma, no h/o intubation and not on any home O2/CPAP, BIBA accompanied by  for worsening SOB/cough x 1 wk. Per , pt has been feeling worsening SOB with cough productive of small white-yellow sputum and pain with coughing. R sided pain, worse on deep inspiration and palpation. Noted increased intermittent wheezing and has been using neb tx and albuterol pump more frequently. No fevers but did note chills at home.  reports significant snoring at home. Denies substernal chest pain, palpitations, abdominal pain, nausea, vomiting, diarrhea, dysuria, hematuria, LE swelling, or rash.    PMH: as above  PSH: hysterectomy  Meds: per surescripts (family unsure) ventolin neb, synthroid 137mcg, pantoprazole 40mg QD, Lasix 40mg QD, atorvastatin 40mg QHS, lisinopril 10mg QD, coreg 6.25mg BID, novolog (unsure dose)  Allergies: penicillin  FHx: non-contributory  SHx: never smoker, denies other drug or alcohol use    ED course:  S/p CTX 1g, azithromycin 500mg x 1, lasix 40mg IV x 1, solumedrol 125mg x 1, duoneb x 1  ED vitals: T 94.4 HR69 /87 RR:18  O2 94 on RA EKG:  Labs/imaging: HgB 10.6, HCO3 36, glucose 183. TSH 10.699, . ABG 7.42/61/93  CXR:-> bilateral opacities w R hilar enlargement no acute consolidation, no pleural effusion  Consults: ICU rejected, weaned off BiPAP    Admitted to [facility] for further management. (09 May 2025 09:21)    PAST MEDICAL & SURGICAL HISTORY:  Diabetes type 2      Asthma      HTN (hypertension)      Hyperlipidemia      Chronic systolic congestive heart failure      H/O: hysterectomy        MEDICATIONS  (STANDING):  albuterol/ipratropium for Nebulization 3 milliLiter(s) Nebulizer every 4 hours  atorvastatin 40 milliGRAM(s) Oral at bedtime  dextrose 5%. 1000 milliLiter(s) (50 mL/Hr) IV Continuous <Continuous>  dextrose 5%. 1000 milliLiter(s) (100 mL/Hr) IV Continuous <Continuous>  dextrose 50% Injectable 25 Gram(s) IV Push once  dextrose 50% Injectable 12.5 Gram(s) IV Push once  dextrose 50% Injectable 25 Gram(s) IV Push once  enoxaparin Injectable 40 milliGRAM(s) SubCutaneous every 12 hours  furosemide    Tablet 40 milliGRAM(s) Oral every 24 hours  glucagon  Injectable 1 milliGRAM(s) IntraMuscular once  insulin glargine Injectable (LANTUS) 12 Unit(s) SubCutaneous at bedtime  insulin lispro (ADMELOG) corrective regimen sliding scale   SubCutaneous three times a day before meals  insulin lispro (ADMELOG) corrective regimen sliding scale   SubCutaneous at bedtime  insulin lispro Injectable (ADMELOG) 8 Unit(s) SubCutaneous three times a day before meals  levothyroxine 150 MICROGram(s) Oral daily  lisinopril 10 milliGRAM(s) Oral daily  pantoprazole    Tablet 40 milliGRAM(s) Oral before breakfast  predniSONE   Tablet 40 milliGRAM(s) Oral daily    MEDICATIONS  (PRN):  acetaminophen     Tablet .. 650 milliGRAM(s) Oral every 6 hours PRN Temp greater or equal to 38C (100.4F), Mild Pain (1 - 3)  dextrose Oral Gel 15 Gram(s) Oral once PRN Blood Glucose LESS THAN 70 milliGRAM(s)/deciliter  ondansetron Injectable 4 milliGRAM(s) IV Push every 8 hours PRN Nausea and/or Vomiting      FAMILY HISTORY:      CBC Full  -  ( 12 May 2025 05:30 )  WBC Count : 8.88 K/uL  RBC Count : 3.73 M/uL  Hemoglobin : 10.4 g/dL  Hematocrit : 33.2 %  Platelet Count - Automated : 242 K/uL  Mean Cell Volume : 89.0 fl  Mean Cell Hemoglobin : 27.9 pg  Mean Cell Hemoglobin Concentration : 31.3 g/dL  Auto Neutrophil # : x  Auto Lymphocyte # : x  Auto Monocyte # : x  Auto Eosinophil # : x  Auto Basophil # : x  Auto Neutrophil % : x  Auto Lymphocyte % : x  Auto Monocyte % : x  Auto Eosinophil % : x  Auto Basophil % : x      05-12    142  |  98  |  33[H]  ----------------------------<  90  4.1   |  36[H]  |  0.83    Ca    9.7      12 May 2025 05:30  Phos  2.8     05-12  Mg     2.2     05-12        Urinalysis Basic - ( 12 May 2025 05:30 )    Color: x / Appearance: x / SG: x / pH: x  Gluc: 90 mg/dL / Ketone: x  / Bili: x / Urobili: x   Blood: x / Protein: x / Nitrite: x   Leuk Esterase: x / RBC: x / WBC x   Sq Epi: x / Non Sq Epi: x / Bacteria: x        Radiology :       ACC: [id number] EXAM:  CT ANGIO CHEST PULM ART [facility]   ORDERED BY: GELA FALCON     PROCEDURE DATE:  05/09/2025          INTERPRETATION:  CLINICAL INFORMATION: Shortness of breath    COMPARISON: None.    CONTRAST/COMPLICATIONS:  IV Contrast: [id number]  70 cc administered   30 cc discarded  Oral Contrast: NONE  .    PROCEDURE:  CT Angiography of the Chest.  Sagittal and coronal reformats were performed as well as 3D (MIP)   reconstructions.    FINDINGS:    LUNGS AND LARGE AIRWAYS: Patent central airways. Mild dependent   atelectasis. No pulmonary consolidation or suspicious nodules.  PLEURA: No significant pleural effusion.  VESSELS: Images are degraded by respiratory motion and patient's large   body habitus. Artifact from upper extremities further limits the   evaluation. No central pulmonary emboli are seen. Apparent hypodensity of   branches of pulmonary artery to the right lower lobe, probably   artifactual.  HEART: Heart size is normal. No pericardial effusion.  MEDIASTINUM AND PEDRO: No lymphadenopathy.  CHEST WALL AND LOWER NECK: Within normal limits.  VISUALIZED UPPER ABDOMEN: Small Cholelithiasis. Left renal cyst.  BONES: Degenerative changes..    IMPRESSION:  Study limited by respiratory motion, large body habitus and suboptimal   positioning. No central pulmonary emboli visualized.            Review of Systems : per HPI         Vital Signs Last 24 Hrs  T(C): 36.3 (12 May 2025 05:30), Max: 36.6 (11 May 2025 17:16)  T(F): 97.4 (12 May 2025 05:30), Max: 97.9 (11 May 2025 17:16)  HR: 61 (12 May 2025 07:30) (61 - 73)  BP: 103/50 (12 May 2025 07:30) (101/72 - 129/71)  BP(mean): --  RR: 18 (12 May 2025 05:30) (18 - 18)  SpO2: 100% (12 May 2025 05:30) (97% - 100%)    Parameters below as of 11 May 2025 21:22  Patient On (Oxygen Delivery Method): nasal cannula  O2 Flow (L/min): 2          Physical Exam: m o 84 y o woman lying comfortably in semi Gracia's position , awake , alert , no acute complaints     Head: normocephalic , atraumatic    Eyes: PERRLA , EOMI , no nystagmus , sclera anicteric    ENT / FACE: neg nasal discharge , uvula midline , no oropharyngeal erythema / exudate    Neck: supple , negative JVD , negative carotid bruits , no thyromegaly    Chest: CTA bilaterally      Cardiovascular: regular rate and rhythm , neg murmurs / rubs / gallops    Abdomen: soft , non distended , no tenderness to palpation in all 4 quadrants ,  normal bowel sounds     Extremities: LE  edema     Neurologic Exam:     Alert and oriented to person , place , date/year     Cranial Nerves:           II:                         pupils equal , round and reactive to light , visual fields intact         III/ IV/VI:             extraocular movements intact , neg nystagmus , neg ptosis        V:                        facial sensation intact , V1-3 normal        VII:                      face symmetric , no droop , normal eye closure and smile        VIII:                     hearing intact to finger rub bilaterally        IX and X:             no hoarseness , gag intact , palate/ uvula rise symmetrically        XI:                       SCM / trapezius strength intact bilateral        XII:                      no tongue deviation    Motor Exam:        > 3+/5 x 4 extremities , without drift     Sensation:         intact to light touch x 4 extremities                            no neglect or extinction on double simultaneous testing    DTR:           biceps/brachioradialis: equal                            patella/ankle: equal          neg Babinski      Coordination:            Finger to Nose:  neg dysmetria bilaterally      Gait:  not tested         PM&R Impression: admitted for acute on chronic respiratory failure with hypoxia and hypercapnia likely 2/2 obesity hypoventilation vs acute asthma exacerbation    - deconditioned    - no focal weakness        Recommendations / Plan:       1) Physical / Occupational therapy focusing on therapeutic exercises , equipment evaluation , bed mobility/transfer out of bed evaluation , progressive ambulation with assistive devices prn .    2) Current disposition plan recommendation:    pending functional progress

## 2025-05-12 NOTE — BH CONSULTATION LIAISON ASSESSMENT NOTE - NSBHCHARTREVIEWVS_PSY_A_CORE FT
Vital Signs Last 24 Hrs  T(C): 36.3 (12 May 2025 08:56), Max: 36.6 (11 May 2025 17:16)  T(F): 97.4 (12 May 2025 08:56), Max: 97.9 (11 May 2025 17:16)  HR: 60 (12 May 2025 08:56) (60 - 73)  BP: 109/64 (12 May 2025 08:56) (101/72 - 129/71)  BP(mean): --  RR: 18 (12 May 2025 08:56) (18 - 18)  SpO2: 99% (12 May 2025 08:56) (97% - 100%)    Parameters below as of 12 May 2025 08:56  Patient On (Oxygen Delivery Method): nasal cannula  O2 Flow (L/min): 2

## 2025-05-12 NOTE — BH CONSULTATION LIAISON ASSESSMENT NOTE - HPI (INCLUDE ILLNESS QUALITY, SEVERITY, DURATION, TIMING, CONTEXT, MODIFYING FACTORS, ASSOCIATED SIGNS AND SYMPTOMS)
Pt. is an 85 yo  (Al[place]ian) female. She is , has 7 adult children, and lives with her  of 67 years. A care attendant also comes at regular intervals to help support her. She presents with PMHx of CHF. And was on oral diuretics, HTN, T2DM, hypothyroidism, CAD, COPD/asthma, no h/o intubation and not on any home O2/CPAP, BIBA accompanied by  for worsening SOB/cough x 1 wk. Psychiatry consulted for passive SI (no intent or plan, mentioned to a roommate).     Upon arrival, pt. informed team that she could not speak English and preferred Italian. As such, a translation service provider was used ( ID #[id number]). Pt.  was also lying at an angle and expressed discomfort and pain as she readjusted herself. Pt. described having difficulty moving around and pain in her foot that required Tylenol the night before. During the consult, pt. was cooperative and engaged. Pt. was unaware of the purpose of the consult, and when providers discussed their role and asked about potential comments pt. made about SI, pt. adamantly refused any SI or self harming thoughts or behaviors. Pt. cited her children, grandchildren, and  as important reasons to live, as well as her strong adalberto. She reported not knowing how or why the team had concerns, and denied any past history of SI as well.     When assessing Hx, pt. described seeing a psychiatrist ~8 years ago as her  was using alcohol and she wanted support. She has never been on psychiatric medication before and denied any interest in the present moment.     Pt. denies any present or past Hx of depression, anxiety, karina, or psychosis. Pt. denies using any alcohol, marijuana or other substances. She denies any family history of alcohol use and mental illness, and denies any trauma or adverse experiences that are presently effecting her life other than pain in her foot, which occasionally is so painful that she cries. Pt. endorses occasional sleep disturbances due to watching tv all night. However, pt. describes Tylenol as usually helping. Pt. denies any housing or monetary worries.     Pt. declined further support from psychiatric services at this time.

## 2025-05-12 NOTE — OCCUPATIONAL THERAPY INITIAL EVALUATION ADULT - GENERAL OBSERVATIONS, REHAB EVAL
Pt's KATHERINE Melendez aware of intent to eval/tx; cleared Pt. PT Allis present. Pt received in semi ordoñez - +heplock, o2NC 2L, purewick, bed alarm. Pt agreeable to eval.

## 2025-05-12 NOTE — DISCHARGE NOTE PROVIDER - NSDCMRMEDTOKEN_GEN_ALL_CORE_FT
atorvastatin 40 mg oral tablet: 1 tab(s) orally once a day  Coreg 6.25 mg oral tablet: 1 tab(s) orally 2 times a day  Lasix 40 mg oral tablet: 1 tab(s) orally once a day  levothyroxine 150 mcg (0.15 mg) oral tablet: 1 tab(s) orally once a day  lisinopril 10 mg oral tablet: 1 tab(s) orally once a day  NovoLOG Mix 70/30 FlexPen subcutaneous suspension: 10 unit(s) subcutaneous every 12 hours  Protonix 40 mg oral delayed release tablet: 1 tab(s) orally once a day  Symbicort 80 mcg-4.5 mcg/inh inhalation aerosol: 2 puff(s) inhaled 2 times a day  Ventolin HFA 90 mcg/inh inhalation aerosol: 2 puff(s) inhaled every 6 hours as needed for  bronchospasm

## 2025-05-12 NOTE — OCCUPATIONAL THERAPY INITIAL EVALUATION ADULT - PERTINENT HX OF CURRENT PROBLEM, REHAB EVAL
85 yo F with PMHx of CHF. on oral diuretics, HTN, hypothyroidism, CAD, COPD/asthma, no h/o intubation and not on any home O2/CPAP, BIBA accompanied by  for worsening SOB/cough x 1 wk. Admitted for c/f asthma/COPD exacerbation iso underlying severe BREEZY.

## 2025-05-13 ENCOUNTER — TRANSCRIPTION ENCOUNTER (OUTPATIENT)
Age: 85
End: 2025-05-13

## 2025-05-13 VITALS
RESPIRATION RATE: 18 BRPM | OXYGEN SATURATION: 98 % | SYSTOLIC BLOOD PRESSURE: 99 MMHG | HEART RATE: 65 BPM | DIASTOLIC BLOOD PRESSURE: 60 MMHG | TEMPERATURE: 98 F

## 2025-05-13 LAB
ANION GAP SERPL CALC-SCNC: 10 MMOL/L — SIGNIFICANT CHANGE UP (ref 5–17)
BASOPHILS # BLD AUTO: 0.02 K/UL — SIGNIFICANT CHANGE UP (ref 0–0.2)
BASOPHILS NFR BLD AUTO: 0.3 % — SIGNIFICANT CHANGE UP (ref 0–2)
BUN SERPL-MCNC: 30 MG/DL — HIGH (ref 7–23)
CALCIUM SERPL-MCNC: 9.7 MG/DL — SIGNIFICANT CHANGE UP (ref 8.4–10.5)
CHLORIDE SERPL-SCNC: 99 MMOL/L — SIGNIFICANT CHANGE UP (ref 96–108)
CO2 SERPL-SCNC: 33 MMOL/L — HIGH (ref 22–31)
CREAT SERPL-MCNC: 0.72 MG/DL — SIGNIFICANT CHANGE UP (ref 0.5–1.3)
EGFR: 83 ML/MIN/1.73M2 — SIGNIFICANT CHANGE UP
EGFR: 83 ML/MIN/1.73M2 — SIGNIFICANT CHANGE UP
EOSINOPHIL # BLD AUTO: 0.03 K/UL — SIGNIFICANT CHANGE UP (ref 0–0.5)
EOSINOPHIL NFR BLD AUTO: 0.4 % — SIGNIFICANT CHANGE UP (ref 0–6)
GLUCOSE SERPL-MCNC: 86 MG/DL — SIGNIFICANT CHANGE UP (ref 70–99)
HCT VFR BLD CALC: 32 % — LOW (ref 34.5–45)
HGB BLD-MCNC: 10.2 G/DL — LOW (ref 11.5–15.5)
IMM GRANULOCYTES NFR BLD AUTO: 0.3 % — SIGNIFICANT CHANGE UP (ref 0–0.9)
LYMPHOCYTES # BLD AUTO: 2.39 K/UL — SIGNIFICANT CHANGE UP (ref 1–3.3)
LYMPHOCYTES # BLD AUTO: 30.8 % — SIGNIFICANT CHANGE UP (ref 13–44)
MAGNESIUM SERPL-MCNC: 1.9 MG/DL — SIGNIFICANT CHANGE UP (ref 1.6–2.6)
MCHC RBC-ENTMCNC: 27.9 PG — SIGNIFICANT CHANGE UP (ref 27–34)
MCHC RBC-ENTMCNC: 31.9 G/DL — LOW (ref 32–36)
MCV RBC AUTO: 87.4 FL — SIGNIFICANT CHANGE UP (ref 80–100)
MONOCYTES # BLD AUTO: 0.67 K/UL — SIGNIFICANT CHANGE UP (ref 0–0.9)
MONOCYTES NFR BLD AUTO: 8.6 % — SIGNIFICANT CHANGE UP (ref 2–14)
NEUTROPHILS # BLD AUTO: 4.63 K/UL — SIGNIFICANT CHANGE UP (ref 1.8–7.4)
NEUTROPHILS NFR BLD AUTO: 59.6 % — SIGNIFICANT CHANGE UP (ref 43–77)
NRBC BLD AUTO-RTO: 0 /100 WBCS — SIGNIFICANT CHANGE UP (ref 0–0)
PHOSPHATE SERPL-MCNC: 3.3 MG/DL — SIGNIFICANT CHANGE UP (ref 2.5–4.5)
PLATELET # BLD AUTO: 242 K/UL — SIGNIFICANT CHANGE UP (ref 150–400)
POTASSIUM SERPL-MCNC: 4 MMOL/L — SIGNIFICANT CHANGE UP (ref 3.5–5.3)
POTASSIUM SERPL-SCNC: 4 MMOL/L — SIGNIFICANT CHANGE UP (ref 3.5–5.3)
RBC # BLD: 3.66 M/UL — LOW (ref 3.8–5.2)
RBC # FLD: 17.4 % — HIGH (ref 10.3–14.5)
SODIUM SERPL-SCNC: 142 MMOL/L — SIGNIFICANT CHANGE UP (ref 135–145)
WBC # BLD: 7.76 K/UL — SIGNIFICANT CHANGE UP (ref 3.8–10.5)
WBC # FLD AUTO: 7.76 K/UL — SIGNIFICANT CHANGE UP (ref 3.8–10.5)

## 2025-05-13 RX ADMIN — FUROSEMIDE 40 MILLIGRAM(S): 10 INJECTION INTRAMUSCULAR; INTRAVENOUS at 07:54

## 2025-05-13 RX ADMIN — IPRATROPIUM BROMIDE AND ALBUTEROL SULFATE 3 MILLILITER(S): .5; 2.5 SOLUTION RESPIRATORY (INHALATION) at 09:51

## 2025-05-13 RX ADMIN — IPRATROPIUM BROMIDE AND ALBUTEROL SULFATE 3 MILLILITER(S): .5; 2.5 SOLUTION RESPIRATORY (INHALATION) at 06:25

## 2025-05-13 RX ADMIN — ENOXAPARIN SODIUM 40 MILLIGRAM(S): 100 INJECTION SUBCUTANEOUS at 06:25

## 2025-05-13 RX ADMIN — PREDNISONE 40 MILLIGRAM(S): 20 TABLET ORAL at 06:25

## 2025-05-13 RX ADMIN — Medication 40 MILLIGRAM(S): at 06:25

## 2025-05-13 RX ADMIN — LISINOPRIL 10 MILLIGRAM(S): 5 TABLET ORAL at 06:25

## 2025-05-13 RX ADMIN — Medication 150 MICROGRAM(S): at 06:25

## 2025-05-13 NOTE — PROGRESS NOTE ADULT - SUBJECTIVE AND OBJECTIVE BOX
Physical Medicine and Rehabilitation Progress Note :       Patient is a 84y old  Female who presents with a chief complaint of acute on chronic hypoxemic hypercapnic respiratory failure (12 May 2025 15:30)      HPI:  85 yo F with PMHx of CHF. on oral diuretics, HTN, T2DM, hypothyroidism, CAD, COPD/asthma, no h/o intubation and not on any home O2/CPAP, BIBA accompanied by  for worsening SOB/cough x 1 wk. Per , pt has been feeling worsening SOB with cough productive of small white-yellow sputum and pain with coughing. R sided pain, worse on deep inspiration and palpation. Noted increased intermittent wheezing and has been using neb tx and albuterol pump more frequently. No fevers but did note chills at home.  reports significant snoring at home. Denies substernal chest pain, palpitations, abdominal pain, nausea, vomiting, diarrhea, dysuria, hematuria, LE swelling, or rash.    PMH: as above  PSH: hysterectomy  Meds: per surescripts (family unsure) ventolin neb, synthroid 137mcg, pantoprazole 40mg QD, Lasix 40mg QD, atorvastatin 40mg QHS, lisinopril 10mg QD, coreg 6.25mg BID, novolog (unsure dose)  Allergies: penicillin  FHx: non-contributory  SHx: never smoker, denies other drug or alcohol use    ED course:  S/p CTX 1g, azithromycin 500mg x 1, lasix 40mg IV x 1, solumedrol 125mg x 1, duoneb x 1  ED vitals: T 94.4 HR69 /87 RR:18  O2 94 on RA EKG:  Labs/imaging: HgB 10.6, HCO3 36, glucose 183. TSH 10.699, . ABG 7.42/61/93  CXR:-> bilateral opacities w R hilar enlargement no acute consolidation, no pleural effusion  Consults: ICU rejected, weaned off BiPAP    Admitted to [facility] for further management. (09 May 2025 09:21)                            10.2   7.76  )-----------( 242      ( 13 May 2025 07:30 )             32.0       05-13    142  |  99  |  30[H]  ----------------------------<  86  4.0   |  33[H]  |  0.72    Ca    9.7      13 May 2025 07:30  Phos  3.3     05-13  Mg     1.9     05-13      Vital Signs Last 24 Hrs  T(C): 36.6 (13 May 2025 08:54), Max: 36.6 (13 May 2025 05:27)  T(F): 97.8 (13 May 2025 08:54), Max: 97.8 (13 May 2025 05:27)  HR: 65 (13 May 2025 08:54) (65 - 74)  BP: 99/60 (13 May 2025 08:54) (99/60 - 117/60)  BP(mean): --  RR: 18 (13 May 2025 08:54) (18 - 18)  SpO2: 98% (13 May 2025 08:54) (92% - 98%)    Parameters below as of 13 May 2025 08:54  Patient On (Oxygen Delivery Method): nasal cannula, 2  O2 Flow (L/min): 2      MEDICATIONS  (STANDING):  albuterol/ipratropium for Nebulization 3 milliLiter(s) Nebulizer every 4 hours  atorvastatin 40 milliGRAM(s) Oral at bedtime  dextrose 5%. 1000 milliLiter(s) (50 mL/Hr) IV Continuous <Continuous>  dextrose 5%. 1000 milliLiter(s) (100 mL/Hr) IV Continuous <Continuous>  dextrose 50% Injectable 25 Gram(s) IV Push once  dextrose 50% Injectable 12.5 Gram(s) IV Push once  dextrose 50% Injectable 25 Gram(s) IV Push once  enoxaparin Injectable 40 milliGRAM(s) SubCutaneous every 12 hours  furosemide    Tablet 40 milliGRAM(s) Oral every 24 hours  glucagon  Injectable 1 milliGRAM(s) IntraMuscular once  insulin glargine Injectable (LANTUS) 12 Unit(s) SubCutaneous at bedtime  insulin lispro (ADMELOG) corrective regimen sliding scale   SubCutaneous three times a day before meals  insulin lispro (ADMELOG) corrective regimen sliding scale   SubCutaneous at bedtime  insulin lispro Injectable (ADMELOG) 8 Unit(s) SubCutaneous three times a day before meals  levothyroxine 150 MICROGram(s) Oral daily  lisinopril 10 milliGRAM(s) Oral daily  pantoprazole    Tablet 40 milliGRAM(s) Oral before breakfast    MEDICATIONS  (PRN):  acetaminophen     Tablet .. 650 milliGRAM(s) Oral every 6 hours PRN Temp greater or equal to 38C (100.4F), Mild Pain (1 - 3)  dextrose Oral Gel 15 Gram(s) Oral once PRN Blood Glucose LESS THAN 70 milliGRAM(s)/deciliter  ondansetron Injectable 4 milliGRAM(s) IV Push every 8 hours PRN Nausea and/or Vomiting      T(C): 36.6 (05-13-25 @ 08:54), Max: 36.6 (05-13-25 @ 05:27)  HR: 65 (05-13-25 @ 08:54) (65 - 74)  BP: 99/60 (05-13-25 @ 08:54) (99/60 - 117/60)  RR: 18 (05-13-25 @ 08:54) (18 - 18)  SpO2: 98% (05-13-25 @ 08:54) (92% - 98%)    Physical Exam:   m o 84 y o woman lying comfortably in semi Gracia's position , awake , alert , no acute complaints     Head: normocephalic , atraumatic    Eyes: PERRLA , EOMI , no nystagmus , sclera anicteric    ENT / FACE: neg nasal discharge , uvula midline , no oropharyngeal erythema / exudate    Neck: supple , negative JVD , negative carotid bruits , no thyromegaly    Chest: CTA bilaterally      Cardiovascular: regular rate and rhythm , neg murmurs / rubs / gallops    Abdomen: soft , non distended , no tenderness to palpation in all 4 quadrants ,  normal bowel sounds     Extremities: LE  edema     Neurologic Exam:     Alert and oriented to person , place , date/year     Cranial Nerves:           II:                         pupils equal , round and reactive to light , visual fields intact         III/ IV/VI:             extraocular movements intact , neg nystagmus , neg ptosis        V:                        facial sensation intact , V1-3 normal        VII:                      face symmetric , no droop , normal eye closure and smile        VIII:                     hearing intact to finger rub bilaterally        IX and X:             no hoarseness , gag intact , palate/ uvula rise symmetrically        XI:                       SCM / trapezius strength intact bilateral        XII:                      no tongue deviation    Motor Exam:        > 3+/5 x 4 extremities , without drift     Sensation:         intact to light touch x 4 extremities                            no neglect or extinction on double simultaneous testing    DTR:           biceps/brachioradialis: equal                            patella/ankle: equal          neg Babinski      Coordination:            Finger to Nose:  neg dysmetria bilaterally        Initial Functional Status Assessment :       Previous Level of Function:     · Ambulation Skills  independent; needs device; rollator  · Transfer Skills  independent; needs device; rollator  · ADL Skills  needed assist; needs device; rollator, shower chair  · Work/Leisure Activity  independent; needs device; rollator  · Additional Comments  denies falls in past year, apartment, elevator, no steps    Cognitive Status Examination:   · Orientation  oriented to person, place, time and situation  · Level of Consciousness  alert  · Follows Commands and Answers Questions  100% of the time  · Personal Safety and Judgment  intact  · Short Term Memory  intact  · Long Term Memory  intact    Range of Motion Exam:   · Range of Motion Examination  no ROM deficits were identified  · Active Range of Motion Examination  no Active ROM deficits were identified    Manual Muscle Testing:   · Manual Muscle Testing Results  grossly assessed due to  functional observation against gravity > 3/5 MMT    Bed Mobility: Rolling/Turning:     · Level of Pal[place]  minimum assist (75% patients effort)  · Physical Assist/Nonphysical Assist  1 person assist; nonverbal cues (demo/gestures); verbal cues    Bed Mobility: Scooting/Bridging:     · Level of Pal[place]  minimum assist (75% patients effort)  · Physical Assist/Nonphysical Assist  1 person assist; nonverbal cues (demo/gestures); verbal cues    Bed Mobility: Sit to Supine:     · Level of Pal[place]  contact guard  · Physical Assist/Nonphysical Assist  verbal cues; nonverbal cues (demo/gestures)    Bed Mobility: Supine to Sit:     · Level of Pal[place]  minimum assist (75% patients effort)  · Physical Assist/Nonphysical Assist  1 person assist; verbal cues; nonverbal cues (demo/gestures)    Bed Mobility Analysis:     · Bed Mobility Limitations  decreased ability to use arms for pushing/pulling; decreased ability to use legs for bridging/pushing  · Impairments Contributing to Impaired Bed Mobility  impaired balance; pain; decreased strength    Bed/Chair Transfer Safety Analysis:     · Impairments Contributing to Impaired Transfers  decreased strength; impaired balance; pain    Transfer: Sit to Stand:     · Level of Palo  maximum assist (25% patients effort)  · Physical Assist/Nonphysical Assist  2 person assist  · Assistive Device  B hand held assist    Transfer: Stand to Sit:     · Level of Palo  maximum assist (25% patients effort)  · Physical Assist/Nonphysical Assist  2 person assist  · Assistive Device  B hand held assist    Sit/Stand Transfer Safety Analysis:     · Impairments Contributing to Impaired Transfers  pain; decreased strength; impaired balance; impaired postural control    Gait Skills:     · Physical Assist/Nonphysical Assist  2 person assist  · Assistive Device  B hand held assist  · Gait Distance  4 side steps    Gait Analysis:     · Gait Deviations Noted  decreased stanley; increased time in double stance; decreased weight-shifting ability  · Impairments Contributing to Gait Deviations  impaired balance; pain; decreased strength    Balance Skills Assessment:     · Sitting Balance: Static  good balance  · Sitting Balance: Dynamic  good balance  · Sit-to-Stand Balance  fair minus  · Standing Balance: Static  fair minus  · Standing Balance: Dynamic  poor plus  · Systems Impairment Contributing to Balance Disturbance  musculoskeletal  · Identified Impairments Contributing to Balance Disturbance  pain; decreased strength    Clinical Impressions:   · Criteria for Skilled Therapeutic Interventions  impairments found; functional limitations in following categories; risk reduction/prevention; rehab potential; therapy frequency; anticipated discharge recommendation  · Impairments Found (describe specific impairments)  aerobic capacity/endurance; gait, locomotion, and balance; muscle strength  · Functional Limitations in Following Categories (describe specific limitations)  self-care; home management; community/leisure  · Risk Reduction/Prevention (Describe Specific Areas of risk reduction/prevention)  risk factors  · Risk Areas  fall      Fine Motor Coordination:   · Left Hand, Manipulation of Objects  normal performance  · Right Hand, Manipulation of Objects  normal performance    Upper Body Dressing Training:     · Level of Palo  moderate assist (50% patients effort)  · Physical Assist/Nonphysical Assist  1 person assist; verbal cues    Lower Body Dressing Training:     · Level of Pal[place]  maximum assist (25% patients effort)  · Physical Assist/Nonphysical Assist  1 person assist; verbal cues    Toilet Hygiene Training:     · Level of Pal[place]  maximum assist (25% patients effort)  · Physical Assist/Nonphysical Assist  1 person assist; verbal cues    Grooming Training:     · Level of Palo  minimum assist (75% patients effort)  · Physical Assist/Nonphysical Assist  1 person assist; verbal cues          PM&R Impression : as above    Current disposition plan recommendation :    subacute rehab placement

## 2025-05-13 NOTE — DISCHARGE NOTE NURSING/CASE MANAGEMENT/SOCIAL WORK - PATIENT PORTAL LINK FT
You can access the FollowMyHealth Patient Portal offered by [facility] by registering at the following website: [email or web address]. By joining [facility]’s FollowMyHealth portal, you will also be able to view your health information using other applications (apps) compatible with our system.

## 2025-05-13 NOTE — PROGRESS NOTE ADULT - REASON FOR ADMISSION
acute on chronic hypoxemic hypercapnic respiratory failure

## 2025-05-13 NOTE — DISCHARGE NOTE NURSING/CASE MANAGEMENT/SOCIAL WORK - FINANCIAL ASSISTANCE
[facility] provides services at a reduced cost to those who are determined to be eligible through [facility]’s financial assistance program. Information regarding [facility]’s financial assistance program can be found by going to [email or web address] or by calling [phone].

## 2025-05-13 NOTE — PROGRESS NOTE ADULT - PROVIDER SPECIALTY LIST ADULT
Endocrinology
Endocrinology
Pulmonology
Endocrinology
Internal Medicine
Rehab Medicine
Internal Medicine
Internal Medicine

## 2025-05-13 NOTE — DISCHARGE NOTE NURSING/CASE MANAGEMENT/SOCIAL WORK - NSDCFUADDAPPT_GEN_ALL_CORE_FT
Please call [phone] to schedule an appointment with Dr. Lai Siddiqi at [address] [place] [address] within 2 weeks    Please schedule an appointment within 2 weeks with the [facility] by calling [phone]

## 2025-05-13 NOTE — PROGRESS NOTE ADULT - ASSESSMENT
83 yo F with PMHx of CHF. on oral diuretics, HTN, hypothyroidism, CAD, COPD/asthma, no h/o intubation and not on any home O2/CPAP, BIBA accompanied by  for worsening SOB/cough x 1 wk. Admitted for acute on chronic respiratory failure with hypoxia and hypercapnia likely 2/2 obesity hypoventilation vs acute asthma exacerbation.
84 year old female with CHF, HTN, T2DM, hypothyroidism, CAD, asthma, and obesity who was brought in for shortness of breath and cough for one week. Patient was hypoxic and hypercarbic in the ED requiring BiPAP. Pulmonary consulted for hypoxic/hypercapnic respiratory failure.    Data review:  BMI 47.7  ABG 7.42/61/93 on 2L NC  HCO3 36  CTA chest: no central pulmonary embolism; enlarged vessels; bilateral atelectasis     Patient presenting with hypoxic and hypercapnic respiratory failure likely from undiagnosed BREEZY/OHS, there is also likely a fluid component given her grade II diastolic dysfunction. Hypoventilation and chronic hypercarbia is evident on her ABG. Her bicarbonate is slightly higher than her expected for the degree of hypercapnia. Given hypercarbia on her ABG, patient should be on nightly NIV, although refusing at this time.  Patient carries a diagnosis of asthma, she was diagnosed in adulthood and it is unclear if it is true asthma, could possibly be obesity asthma.     Recommendations:  - Start LABA/ICS, should be discharged on Symbicort 2 puffs BID  - Can continue her PRN albuterol on discharge   - Can discontinue steroids  - Continue with diuresis  - Patient should be on BiPAP nightly and with naps, should be discharged with BiPAP  - Provide the patient with an incentive spirometer, encourage her to take home to use as well given limited mobility   - Please ensure outpatient follow up with Dr. Siddiqi on discharge to obtain PFTs and a sleep study      Pulmonary to sign off. Please reconsult as needed  Discussed with Dr. Siddiqi  
I M    85 yo F with PMHx of CHF. on oral diuretics, HTN, hypothyroidism, CAD, COPD/asthma, no h/o intubation and not on any home O2/CPAP, BIBA accompanied by  for worsening SOB/cough x 1 wk. Admitted for acute on chronic respiratory failure with hypoxia and hypercapnia likely 2/2 obesity hypoventilation vs acute asthma exacerbation.      Problem/Plan - 1:  ·  Problem: Acute respiratory failure with hypercapnia.   ·  Plan: Patient presented with pCO2 61 on ABG, complaining of worsening dyspnea and productive cough as well as chest pain/tightness. Endorses R sided pleurisy with intermittent wheezing, uses nebulizer tx and albuterol pump frequently. Significant snoring per her . Infectious workup negative. Saturating well on room air. CTA PE negative. CXR showing bilateral opacities with R hilar enlargement. Evaluated by pulm, less concerned for pneumonia will monitor off abx and treat as asthma exacerbation vs obesity hypoventilation.     -standing duonebs q4h  -continue to diurese for HF as below, monitor bicarb  -BiPAP nightly and w/ naps. Patient refusing in hospital and at home despite explaining risks and reasoning behind BiPAP  -outpt pulm f/u for PFTs + sleep study  -incentive spirometry  -OOBTC.    Problem/Plan - 2:  ·  Problem: Obesity hypoventilation syndrome.   ·  Plan: BMI 47.7 with  reporting frequent snoring. As above.    Problem/Plan - 3:  ·  Problem: Acute asthma exacerbation.   ·  Plan: Per surescripts, ventolin nebulizer used.    -as above.    Problem/Plan - 4:  ·  Problem: Heart failure, chronic systolic.   ·  Plan: BNP on admission, but CXR notable for infiltrates and 1+ peripheral edema on exam. 5/9 TTE showing grade II LV diastolic dysfunction, severely dilated LA, and EF ~60%. Reportedly takes lasix 40 mg qD    -c/w home lasix regimen, increase as needed if concerned for pulm edema vs more heart failure driven hypercapnia.    Problem/Plan - 5:  ·  Problem: Morbid obesity.   ·  Plan: BMI 47.7. Consistent Carb Diet.     -promote diet + exercise on admission  -can set up with outpt dietician  -outpt hypoventilation as above.    Problem/Plan - 6:  ·  Problem: Iron deficiency anemia.   ·  Plan: Presenting with Hgb 10.6 Total iron 25, Iron saturation 7%. Ferritin 16.  Per ganzoni equation with target Hgb 12, deficit of 1138 mg.     -IV iron sucrose 500 mg x 2 (5/10-5/11)  -replete orally for remainder of deficit.    Problem/Plan - 7:  ·  Problem: Hypertension.   ·  Plan: Home meds: carvedilol 6.25mg BID, lisinopril 10mg QD    - c/w home lisinopril  - hold home coreg iso asthma exacerbation.    Problem/Plan - 8:  ·  Problem: Hypothyroid.   ·  Plan: TSH elevated, presented hypothermic  May be iso infection however given elevated TSH will increase home dose  On home synthroid 137mcg     - increased synthroid to 150mcg.  - f/u endo recs.    Problem/Plan - 9:  ·  Problem: CAD (coronary artery disease).   ·  Plan: Home meds: atorvastatin 40mg QD    - continue home meds.    Problem/Plan - 10:  ·  Problem: Type 2 diabetes mellitus.   ·  Plan; Home meds: Novolog, unsure dose. Endocrinology consulted. A1c 9.0 on admission.     -f/u endo recs.   -12u lantus nightly  -8u lispro mealtime  -mISS with meals and Sherri at bedtime  -outpt endo f/u.    Problem/Plan - 11:  ·  Problem: GERD (gastroesophageal reflux disease).   ·  Plan: Home meds: pantoprazole 40mg QD    - continue home meds.    Problem/Plan - 12:  ·  Problem: Prophylactic measure.   ·  Plan: F: as needed  E: K<4 Mg<2  N: carb consistent diet  A: as tolerated  GI ppx: none  DVT ppx: Lovenox 40mg subQ  Code: full  Dispo: F.    
83 yo F with PMHx of CHF. on oral diuretics, HTN, hypothyroidism, CAD, COPD/asthma, no h/o intubation and not on any home O2/CPAP, BIBA accompanied by  for worsening SOB/cough x 1 wk. Admitted for acute on chronic respiratory failure with hypoxia and hypercapnia likely 2/2 obesity hypoventilation vs acute asthma exacerbation.
85 yo F with PMHx of CHF. on oral diuretics, HTN, hypothyroidism, CAD, COPD/asthma, no h/o intubation and not on any home O2/CPAP, BIBA accompanied by  for worsening SOB/cough x 1 wk. Admitted for acute on chronic respiratory failure with hypoxia and hypercapnia likely 2/2 obesity hypoventilation vs acute asthma exacerbation.

## 2025-05-13 NOTE — DISCHARGE NOTE NURSING/CASE MANAGEMENT/SOCIAL WORK - NSDCPEFALRISK_GEN_ALL_CORE
For information on Fall & Injury Prevention, visit: [email or web address] OR  [email or web address] OR  https://www.cdc.gov/steadi/patient.html

## 2025-05-14 LAB
CULTURE RESULTS: SIGNIFICANT CHANGE UP
CULTURE RESULTS: SIGNIFICANT CHANGE UP
SPECIMEN SOURCE: SIGNIFICANT CHANGE UP
SPECIMEN SOURCE: SIGNIFICANT CHANGE UP

## 2025-05-22 DIAGNOSIS — I25.10 ATHEROSCLEROTIC HEART DISEASE OF NATIVE CORONARY ARTERY WITHOUT ANGINA PECTORIS: ICD-10-CM

## 2025-05-22 DIAGNOSIS — R06.02 SHORTNESS OF BREATH: ICD-10-CM

## 2025-05-22 DIAGNOSIS — J44.89 OTHER SPECIFIED CHRONIC OBSTRUCTIVE PULMONARY DISEASE: ICD-10-CM

## 2025-05-22 DIAGNOSIS — I11.0 HYPERTENSIVE HEART DISEASE WITH HEART FAILURE: ICD-10-CM

## 2025-05-22 DIAGNOSIS — E66.2 MORBID (SEVERE) OBESITY WITH ALVEOLAR HYPOVENTILATION: ICD-10-CM

## 2025-05-22 DIAGNOSIS — K21.9 GASTRO-ESOPHAGEAL REFLUX DISEASE WITHOUT ESOPHAGITIS: ICD-10-CM

## 2025-05-22 DIAGNOSIS — J96.22 ACUTE AND CHRONIC RESPIRATORY FAILURE WITH HYPERCAPNIA: ICD-10-CM

## 2025-05-22 DIAGNOSIS — J45.901 UNSPECIFIED ASTHMA WITH (ACUTE) EXACERBATION: ICD-10-CM

## 2025-05-22 DIAGNOSIS — I50.22 CHRONIC SYSTOLIC (CONGESTIVE) HEART FAILURE: ICD-10-CM

## 2025-05-22 DIAGNOSIS — E03.9 HYPOTHYROIDISM, UNSPECIFIED: ICD-10-CM

## 2025-05-22 DIAGNOSIS — E11.65 TYPE 2 DIABETES MELLITUS WITH HYPERGLYCEMIA: ICD-10-CM

## 2025-05-22 DIAGNOSIS — J96.21 ACUTE AND CHRONIC RESPIRATORY FAILURE WITH HYPOXIA: ICD-10-CM

## 2025-05-22 DIAGNOSIS — Z88.0 ALLERGY STATUS TO PENICILLIN: ICD-10-CM

## 2025-05-22 DIAGNOSIS — Z79.890 HORMONE REPLACEMENT THERAPY: ICD-10-CM

## 2025-05-22 DIAGNOSIS — D50.9 IRON DEFICIENCY ANEMIA, UNSPECIFIED: ICD-10-CM

## 2025-05-22 DIAGNOSIS — Z56.0 UNEMPLOYMENT, UNSPECIFIED: ICD-10-CM

## 2025-05-22 DIAGNOSIS — E66.813 OBESITY, CLASS 3: ICD-10-CM

## 2025-05-22 DIAGNOSIS — Z79.4 LONG TERM (CURRENT) USE OF INSULIN: ICD-10-CM

## 2025-05-22 SDOH — ECONOMIC STABILITY - INCOME SECURITY: UNEMPLOYMENT, UNSPECIFIED: Z56.0

## 2025-05-28 ENCOUNTER — APPOINTMENT (OUTPATIENT)
Dept: PULMONOLOGY | Facility: CLINIC | Age: 85
End: 2025-05-28
Payer: MEDICARE

## 2025-05-28 VITALS
DIASTOLIC BLOOD PRESSURE: 53 MMHG | WEIGHT: 249 LBS | SYSTOLIC BLOOD PRESSURE: 129 MMHG | OXYGEN SATURATION: 94 % | BODY MASS INDEX: 48.88 KG/M2 | HEART RATE: 60 BPM | HEIGHT: 60 IN | TEMPERATURE: 98.3 F

## 2025-05-28 DIAGNOSIS — R06.89 OTHER ABNORMALITIES OF BREATHING: ICD-10-CM

## 2025-05-28 DIAGNOSIS — E66.9 OBESITY, UNSPECIFIED: ICD-10-CM

## 2025-05-28 DIAGNOSIS — J45.40 MODERATE PERSISTENT ASTHMA, UNCOMPLICATED: ICD-10-CM

## 2025-05-28 PROCEDURE — G2211 COMPLEX E/M VISIT ADD ON: CPT

## 2025-05-28 PROCEDURE — 99204 OFFICE O/P NEW MOD 45 MIN: CPT

## 2025-05-28 RX ORDER — BUDESONIDE AND FORMOTEROL FUMARATE DIHYDRATE 160; 4.5 UG/1; UG/1
160-4.5 AEROSOL RESPIRATORY (INHALATION) TWICE DAILY
Qty: 1 | Refills: 3 | Status: ACTIVE | COMMUNITY
Start: 2025-05-28 | End: 1900-01-01

## 2025-07-15 ENCOUNTER — APPOINTMENT (OUTPATIENT)
Dept: PULMONOLOGY | Facility: CLINIC | Age: 85
End: 2025-07-15